# Patient Record
Sex: FEMALE | Race: BLACK OR AFRICAN AMERICAN | NOT HISPANIC OR LATINO | Employment: FULL TIME | URBAN - METROPOLITAN AREA
[De-identification: names, ages, dates, MRNs, and addresses within clinical notes are randomized per-mention and may not be internally consistent; named-entity substitution may affect disease eponyms.]

---

## 2017-01-11 ENCOUNTER — LAB VISIT (OUTPATIENT)
Dept: LAB | Facility: HOSPITAL | Age: 62
End: 2017-01-11
Attending: OBSTETRICS & GYNECOLOGY
Payer: COMMERCIAL

## 2017-01-11 ENCOUNTER — OFFICE VISIT (OUTPATIENT)
Dept: GYNECOLOGIC ONCOLOGY | Facility: CLINIC | Age: 62
End: 2017-01-11
Payer: COMMERCIAL

## 2017-01-11 VITALS
HEIGHT: 68 IN | WEIGHT: 235.44 LBS | SYSTOLIC BLOOD PRESSURE: 118 MMHG | DIASTOLIC BLOOD PRESSURE: 54 MMHG | BODY MASS INDEX: 35.68 KG/M2 | HEART RATE: 80 BPM

## 2017-01-11 DIAGNOSIS — C80.0 ADENOCARCINOMA CARCINOMATOSIS: Primary | ICD-10-CM

## 2017-01-11 DIAGNOSIS — Z51.11 ENCOUNTER FOR ANTINEOPLASTIC CHEMOTHERAPY: ICD-10-CM

## 2017-01-11 DIAGNOSIS — C80.0 ADENOCARCINOMA CARCINOMATOSIS: ICD-10-CM

## 2017-01-11 LAB
ANION GAP SERPL CALC-SCNC: 9 MMOL/L
BASOPHILS # BLD AUTO: 0.01 K/UL
BASOPHILS NFR BLD: 0.2 %
BUN SERPL-MCNC: 18 MG/DL
CALCIUM SERPL-MCNC: 9.4 MG/DL
CHLORIDE SERPL-SCNC: 104 MMOL/L
CO2 SERPL-SCNC: 29 MMOL/L
CREAT SERPL-MCNC: 0.8 MG/DL
DIFFERENTIAL METHOD: ABNORMAL
EOSINOPHIL # BLD AUTO: 0.1 K/UL
EOSINOPHIL NFR BLD: 1.2 %
ERYTHROCYTE [DISTWIDTH] IN BLOOD BY AUTOMATED COUNT: 17.8 %
EST. GFR  (AFRICAN AMERICAN): >60 ML/MIN/1.73 M^2
EST. GFR  (NON AFRICAN AMERICAN): >60 ML/MIN/1.73 M^2
GLUCOSE SERPL-MCNC: 94 MG/DL
HCT VFR BLD AUTO: 30.6 %
HGB BLD-MCNC: 10.2 G/DL
LYMPHOCYTES # BLD AUTO: 1.3 K/UL
LYMPHOCYTES NFR BLD: 30.4 %
MCH RBC QN AUTO: 26.4 PG
MCHC RBC AUTO-ENTMCNC: 33.3 %
MCV RBC AUTO: 79 FL
MONOCYTES # BLD AUTO: 0.3 K/UL
MONOCYTES NFR BLD: 7.8 %
NEUTROPHILS # BLD AUTO: 2.5 K/UL
NEUTROPHILS NFR BLD: 60.2 %
PLATELET # BLD AUTO: 208 K/UL
PMV BLD AUTO: 8.3 FL
POTASSIUM SERPL-SCNC: 4 MMOL/L
RBC # BLD AUTO: 3.87 M/UL
SODIUM SERPL-SCNC: 142 MMOL/L
WBC # BLD AUTO: 4.11 K/UL

## 2017-01-11 PROCEDURE — 80048 BASIC METABOLIC PNL TOTAL CA: CPT

## 2017-01-11 PROCEDURE — 85025 COMPLETE CBC W/AUTO DIFF WBC: CPT

## 2017-01-11 PROCEDURE — 1159F MED LIST DOCD IN RCRD: CPT | Mod: S$GLB,,, | Performed by: OBSTETRICS & GYNECOLOGY

## 2017-01-11 PROCEDURE — 99999 PR PBB SHADOW E&M-EST. PATIENT-LVL III: CPT | Mod: PBBFAC,,, | Performed by: OBSTETRICS & GYNECOLOGY

## 2017-01-11 PROCEDURE — 99214 OFFICE O/P EST MOD 30 MIN: CPT | Mod: S$GLB,,, | Performed by: OBSTETRICS & GYNECOLOGY

## 2017-01-11 PROCEDURE — 36415 COLL VENOUS BLD VENIPUNCTURE: CPT

## 2017-01-11 RX ORDER — SODIUM CHLORIDE 0.9 % (FLUSH) 0.9 %
10 SYRINGE (ML) INJECTION
Status: CANCELLED | OUTPATIENT
Start: 2017-01-13

## 2017-01-11 RX ORDER — FAMOTIDINE 10 MG/ML
20 INJECTION INTRAVENOUS
Status: CANCELLED | OUTPATIENT
Start: 2017-01-13

## 2017-01-11 RX ORDER — HEPARIN 100 UNIT/ML
500 SYRINGE INTRAVENOUS
Status: CANCELLED | OUTPATIENT
Start: 2017-01-13

## 2017-01-11 NOTE — PROGRESS NOTES
"Subjective:       Patient ID: Beulah Guillaume is a 61 y.o. female.    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 She complains of increasing abdominal distension after eating.     Chemotherapy labs have been reviewed and are appropriate for treatment.     Treatment history:  Taken to OR by Dr. Cole on 11/17/16 for lap cecille and found to have carcinomatosis. CT scan of abd and pelvis was consistent with carcinomatosis.  was 259 and CA 19-9 was 294. She also has been having PMB.     Review of Systems   Constitutional: Positive for fatigue. Negative for chills and fever.   Respiratory: Negative for cough, shortness of breath and wheezing.    Cardiovascular: Negative for chest pain, palpitations and leg swelling.   Gastrointestinal: Positive for abdominal distention. Negative for abdominal pain, constipation, diarrhea, nausea and vomiting.   Genitourinary: Negative for difficulty urinating, dysuria, frequency, genital sores, hematuria, urgency, vaginal bleeding, vaginal discharge and vaginal pain.   Neurological: Negative for weakness and numbness.   Hematological: Negative for adenopathy. Does not bruise/bleed easily.   Psychiatric/Behavioral: The patient is not nervous/anxious.        Objective:       Visit Vitals    BP (!) 118/54    Pulse 80    Ht 5' 8" (1.727 m)    Wt 106.8 kg (235 lb 7.2 oz)    LMP 06/20/2014    BMI 35.8 kg/m2       Physical Exam   Constitutional: She is oriented to person, place, and time. She appears well-developed and well-nourished.   HENT:   Head: Normocephalic and atraumatic.   Eyes: No scleral icterus.   Neck: Neck supple. No tracheal deviation present. No thyroid mass and no thyromegaly present.   Cardiovascular: Normal rate and regular rhythm.    Pulmonary/Chest: Effort normal and breath sounds normal. She has no wheezes.   Abdominal: Soft. She exhibits distension. She exhibits no mass. There is " no hepatosplenomegaly. There is no tenderness. There is no rebound and no guarding.   Genitourinary:   Genitourinary Comments: Bimanual exam:  Vulva: no lesions. Normal appearance  Urethra: Normal size and location. No lesions  Bladder: No masses or tenderness.  Vagina: normal mucosa. No lesion  Cervix: normal   Uterus: unable to palpate due to body habitus.   Adnexa: no masses.  Rectovaginal: fullness in posterior cul de sac.      Musculoskeletal: She exhibits no edema or tenderness.   Lymphadenopathy:     She has no cervical adenopathy.     She has no axillary adenopathy.        Right: No inguinal and no supraclavicular adenopathy present.        Left: No inguinal and no supraclavicular adenopathy present.   Neurological: She is alert and oriented to person, place, and time.   Skin: Skin is warm and dry.   Psychiatric: She has a normal mood and affect. Her behavior is normal. Judgment and thought content normal.       Assessment:       1. Adenocarcinoma carcinomatosis    2. Encounter for antineoplastic chemotherapy        Plan:   Adenocarcinoma carcinomatosis    Proceed with cycle 3 of neoadjuvant chemotherapy for primary peritoneal carcinoma.   RTC in 3 weeks with scan to evaluate for interval debulking.     -     Basic metabolic panel; Future; Expected date: 1/11/17  -     ; Future; Expected date: 1/11/17  -     CBC auto differential; Future; Expected date: 1/11/17  -     CT Abdomen Pelvis With Contrast; Future; Expected date: 1/11/17  -     CT Chest With Contrast; Future; Expected date: 1/11/17    Encounter for antineoplastic chemotherapy

## 2017-01-11 NOTE — MR AVS SNAPSHOT
[facility] - GYN Oncology  [address]  [place] [address]  Phone: [phone]                  Beulah Guillaume   2017 9:30 AM   Office Visit    Description:  Female : 1955   Provider:  Rey Hernandez MD   Department:  [facility] - GYN Oncology           Reason for Visit     Adenocarcinoma carcinomatosis           Diagnoses this Visit        Comments    Adenocarcinoma carcinomatosis    -  Primary     Encounter for antineoplastic chemotherapy                To Do List           Future Appointments        Provider Department Dept Phone    2017 9:00 AM [facility], CHEMO [facility] [phone]    2017 8:30 AM LAB, HEMONC CANCER BLDG [facility] [phone]    2017 9:45 AM Rey Hernandez MD [facility] Oncology [phone]    2/3/2017 9:30 AM [facility], CHEMO [facility] [phone]      Goals (5 Years of Data)     None      Follow-Up and Disposition     Return in about 3 weeks (around 2017).      [facility] On Call     [facility] On Call Nurse Care Line -  Assistance  Registered nurses in the [facility] On Call Center provide clinical advisement, health education, appointment booking, and other advisory services.  Call for this free service at [phone].             Medications           Message regarding Medications     Verify the changes and/or additions to your medication regime listed below are the same as discussed with your clinician today.  If any of these changes or additions are incorrect, please notify your healthcare provider.        STOP taking these medications     tramadol (ULTRAM) 50 mg tablet Take 50 mg by mouth every 6 (six) hours as needed for Pain.           Verify that the below list of medications is an accurate representation of the medications you are currently taking.  If none reported, the list may be blank. If incorrect, please contact your healthcare provider. Carry this list with you in case  "of emergency.           Current Medications     hydrochlorothiazide (MICROZIDE) 12.5 mg capsule     metformin (GLUCOPHAGE) 500 MG tablet 2 (two) times daily with meals.     olmesartan (BENICAR) 5 MG Tab     ondansetron (ZOFRAN-ODT) 8 MG TbDL Take 1 tablet (8 mg total) by mouth every 12 (twelve) hours as needed (nausea and vomiting.).           Clinical Reference Information           Vital Signs - Last Recorded  Most recent update: 1/11/2017  9:00 AM by Vivian Mckeon MA    BP Pulse Ht Wt LMP BMI    (!) 118/54 80 5' 8" (1.727 m) 106.8 kg (235 lb 7.2 oz) 06/20/2014 35.8 kg/m2      Blood Pressure          Most Recent Value    BP  (!)  118/54      Allergies as of 1/11/2017     No Known Allergies      Immunizations Administered on Date of Encounter - 1/11/2017     None      Orders Placed During Today's Visit     Future Labs/Procedures Expected by Expires    Basic metabolic panel  1/11/2017 1/11/2018      1/11/2017 1/11/2018    CBC auto differential  1/11/2017 1/11/2018    CT Abdomen Pelvis With Contrast  1/11/2017 1/11/2018    CT Chest With Contrast  1/11/2017 1/11/2018      [facility] Sign-Up     Activating your [facility] account is as easy as 1-2-3!     1) Visit [email or web address], select Sign Up Now, enter this activation code and your date of birth, then select Next.  [email or web address]  Expires: 2/25/2017 10:06 AM      2) Create a username and password to use when you visit [facility] in the future and select a security question in case you lose your password and select Next.    3) Enter your e-mail address and click Sign Up!    Additional Information  If you have questions, please e-mail [email or web address] or call [phone] to talk to our [facility] staff. Remember, [facility] is NOT to be used for urgent needs. For medical emergencies, dial 911.         "

## 2017-01-13 ENCOUNTER — INFUSION (OUTPATIENT)
Dept: INFUSION THERAPY | Facility: HOSPITAL | Age: 62
End: 2017-01-13
Attending: INTERNAL MEDICINE
Payer: COMMERCIAL

## 2017-01-13 VITALS
DIASTOLIC BLOOD PRESSURE: 64 MMHG | RESPIRATION RATE: 18 BRPM | TEMPERATURE: 99 F | SYSTOLIC BLOOD PRESSURE: 134 MMHG | HEART RATE: 68 BPM

## 2017-01-13 DIAGNOSIS — C80.0 ADENOCARCINOMA CARCINOMATOSIS: Primary | ICD-10-CM

## 2017-01-13 PROCEDURE — 96417 CHEMO IV INFUS EACH ADDL SEQ: CPT

## 2017-01-13 PROCEDURE — 25000003 PHARM REV CODE 250: Performed by: OBSTETRICS & GYNECOLOGY

## 2017-01-13 PROCEDURE — 96413 CHEMO IV INFUSION 1 HR: CPT

## 2017-01-13 PROCEDURE — 96367 TX/PROPH/DG ADDL SEQ IV INF: CPT

## 2017-01-13 PROCEDURE — 96361 HYDRATE IV INFUSION ADD-ON: CPT

## 2017-01-13 PROCEDURE — 96375 TX/PRO/DX INJ NEW DRUG ADDON: CPT

## 2017-01-13 PROCEDURE — 96415 CHEMO IV INFUSION ADDL HR: CPT

## 2017-01-13 PROCEDURE — 63600175 PHARM REV CODE 636 W HCPCS: Performed by: OBSTETRICS & GYNECOLOGY

## 2017-01-13 RX ORDER — FAMOTIDINE 10 MG/ML
20 INJECTION INTRAVENOUS
Status: COMPLETED | OUTPATIENT
Start: 2017-01-13 | End: 2017-01-13

## 2017-01-13 RX ADMIN — PACLITAXEL 390 MG: 6 INJECTION, SOLUTION, CONCENTRATE INTRAVENOUS at 09:01

## 2017-01-13 RX ADMIN — FAMOTIDINE 20 MG: 10 INJECTION INTRAVENOUS at 08:01

## 2017-01-13 RX ADMIN — CARBOPLATIN 870 MG: 10 INJECTION, SOLUTION INTRAVENOUS at 12:01

## 2017-01-13 RX ADMIN — DIPHENHYDRAMINE HYDROCHLORIDE 50 MG: 50 INJECTION, SOLUTION INTRAMUSCULAR; INTRAVENOUS at 09:01

## 2017-01-13 RX ADMIN — DEXAMETHASONE SODIUM PHOSPHATE 0.25 MG: 4 INJECTION, SOLUTION INTRAMUSCULAR; INTRAVENOUS at 08:01

## 2017-01-13 RX ADMIN — SODIUM CHLORIDE: 0.9 INJECTION, SOLUTION INTRAVENOUS at 12:01

## 2017-01-13 NOTE — MR AVS SNAPSHOT
Patient Information     Patient Name Sex     Beulah Guillaume Female 1955      Visit Information        Provider Department Dept Phone Center    2017 9:00 AM [facility], CHEMO [facility] Chemotherapy Infusion [phone] Duane Miller      Patient Instructions     None      Your Current Medications Are     hydrochlorothiazide (MICROZIDE) 12.5 mg capsule    metformin (GLUCOPHAGE) 500 MG tablet    olmesartan (BENICAR) 5 MG Tab    ondansetron (ZOFRAN-ODT) 8 MG TbDL      Facility-Administered Medications     carboplatin (PARAPLATIN) 870 mg in sodium chloride 0.9% 250 mL chemo infusion    diphenhydramine IVPB 50 mg    famotidine (PF) 20 mg/2 mL injection 20 mg    paclitaxel (TAXOL) 175 mg/m2 = 390 mg in sodium chloride 0.9% 500 mL chemo infusion    palonosetron 0.25mg/dexamethasone 20mg IVPB    sodium chloride 0.9% 500 mL infusion      Appointments for Next Year     2017 12:45 PM NON FASTING LAB (15 min.) [facility] LAB, SAME DAY    Arrive at check-in approximately 15 minutes before your scheduled appointment time. Bring all outside medical records and imaging, along with a list of your current medications and insurance card.    2nd Floor    2017  3:00 PM CT ABD PEL W CONTRAST (15 min.) [facility] CT2 64- LIMIT 600 LBS    You must  fast four (4) hours prior to your appointment. Arrive 2 hours early for your appointment to drink the exam prep.    2nd floor    2017  3:30 PM CT CHEST W CONTRAST (15 min.) [facility] CT1 64- LIMIT 450 LBS    Please fast for 4 hours prior to appointment. Arrive at check-in approximately 30 minutes before your scheduled appointment time. Bring all outside medical records and imaging, along with a list of your current medications and insurance card.    2nd floor    2017  8:30 AM NON FASTING LAB (10 min.) [facility] LAB, [facility] CANCER [facility]    Arrive at check-in approximately  15 minutes before your scheduled appointment time. Bring all outside medical records and imaging, along with a list of your current medications and insurance card.    [facility] 3rd Floor    2/1/2017  9:45 AM CHEMOTHERAPHY (15 min.) [facility] - GYN Oncology Rey Hernandez MD    Arrive at check-in approximately 15 minutes before your scheduled appointment time. Bring all outside medical records and imaging, along with a list of your current medications and insurance card.    [facility] 2nd Floor    2/3/2017  9:30 AM INFUSION 300 MIN (300 min.) [facility], CHEMO    Arrive at check-in approximately 15 minutes before your scheduled appointment time. Bring all outside medical records and imaging, along with a list of your current medications and insurance card.    [facility], 5th Floor         Default Flowsheet Data (last 24 hours)      Amb Complex Vitals Harinder        01/13/17 1017 01/13/17 0831             Measurements    /64   taxol at 15 min 125/60       Temp  98.6 °F (37 °C)       Pulse 68 71       Resp 18 18       Pain Assessment    Pain Score  Zero               Allergies     No Known Allergies       Medications You Received from 01/12/2017 1433 to 01/13/2017 1433        Date/Time Order Dose Route Action     01/13/2017 1252 carboplatin (PARAPLATIN) 870 mg in sodium chloride 0.9% 250 mL chemo infusion 870 mg Intravenous New Bag     01/13/2017 0913 diphenhydramine IVPB 50 mg 50 mg Intravenous New Bag     01/13/2017 0854 famotidine (PF) 20 mg/2 mL injection 20 mg 20 mg Intravenous Given     01/13/2017 0958 paclitaxel (TAXOL) 175 mg/m2 = 390 mg in sodium chloride 0.9% 500 mL chemo infusion 390 mg Intravenous New Bag     01/13/2017 0853 palonosetron 0.25mg/dexamethasone 20mg IVPB 0.25 mg Intravenous New Bag     01/13/2017 1255 sodium chloride 0.9% 500 mL infusion   Intravenous New Bag      Current Discharge Medication List     Cannot display discharge  medications since this is not an admission.

## 2017-01-13 NOTE — PLAN OF CARE
Problem: Patient Care Overview (Adult)  Goal: Plan of Care Review  1. PIV  2. Likes to watch tv during treatment   Outcome: Ongoing (interventions implemented as appropriate)  Pt completed today's chemotherapy infusion without incidence, tolerated well.  Discharge instructions provided, patient verbalized understanding.  Patient discharged ambulatory with steady gait. No apparent distress noted prior to discharge.

## 2017-01-21 ENCOUNTER — NURSE TRIAGE (OUTPATIENT)
Dept: ADMINISTRATIVE | Facility: CLINIC | Age: 62
End: 2017-01-21

## 2017-01-21 NOTE — TELEPHONE ENCOUNTER
Reason for Disposition   [1] Fever AND [2] no signs of serious infection or localizing symptoms (all other triage questions negative)    Protocols used:  CANCER - FEVER-A-AH    Patient reports she had chemo about 8 days ago.  Has been with sinus acongestins and head cold.  Wanted to know what tempo should she be concerned with.  advised she would need to go to Ed for temp above 100.4.  States she was only at 100.  States she took some zyrtec and a rajiv den and feels much better now.  Advised that Vicodin had tylenol and to be aware of any medication that may hide her fever.  Advised if temp goes above 100.4 she needs to go to ED.  States her immune system has not been low.  Wbc was normal last checked.  States her daughter is getting a new thermometer in case the one she had was not working well.  Advised to call for any questions or concerns.

## 2017-01-25 ENCOUNTER — HOSPITAL ENCOUNTER (OUTPATIENT)
Dept: RADIOLOGY | Facility: HOSPITAL | Age: 62
Discharge: HOME | End: 2017-01-25
Attending: OBSTETRICS & GYNECOLOGY
Payer: COMMERCIAL

## 2017-01-25 DIAGNOSIS — C80.0 ADENOCARCINOMA CARCINOMATOSIS: ICD-10-CM

## 2017-01-25 PROCEDURE — 25500020 PHARM REV CODE 255: Performed by: OBSTETRICS & GYNECOLOGY

## 2017-01-25 PROCEDURE — 71260 CT THORAX DX C+: CPT | Mod: 26,,, | Performed by: RADIOLOGY

## 2017-01-25 PROCEDURE — 74177 CT ABD & PELVIS W/CONTRAST: CPT | Mod: 26,,, | Performed by: RADIOLOGY

## 2017-01-25 PROCEDURE — 74177 CT ABD & PELVIS W/CONTRAST: CPT | Mod: TC

## 2017-01-25 PROCEDURE — 71260 CT THORAX DX C+: CPT | Mod: TC

## 2017-01-25 RX ADMIN — IOHEXOL 100 ML: 350 INJECTION, SOLUTION INTRAVENOUS at 04:01

## 2017-01-25 RX ADMIN — IOHEXOL 15 ML: 350 INJECTION, SOLUTION INTRAVENOUS at 02:01

## 2017-01-26 ENCOUNTER — TELEPHONE (OUTPATIENT)
Dept: GYNECOLOGIC ONCOLOGY | Facility: CLINIC | Age: 62
End: 2017-01-26

## 2017-01-26 DIAGNOSIS — R91.8 MASS OF MIDDLE LOBE OF RIGHT LUNG: ICD-10-CM

## 2017-01-26 DIAGNOSIS — C80.0 ADENOCARCINOMA CARCINOMATOSIS: Primary | ICD-10-CM

## 2017-01-26 NOTE — PROGRESS NOTES
Patient informed of CT scan of abdomen and pelvis that shows a decrease in omental and peritoneal disease. However, her ascites has worsen and she has a new lesion in there right middle lobe of the lung. Differential diagnosis of metastatic disease vs infection.  had declined from 259 to 75. I told her this results are conflicting between the  and the CT scan.     I told her I plan to do the followin. CT scan of the chest  2. Pulmonary consult   3. Paracentesis for the ascites.     I told her these need to be addressed before I can make a decision about operating on her.

## 2017-01-26 NOTE — TELEPHONE ENCOUNTER
----- Message from Malena Whiting sent at 1/26/2017 11:52 AM CST -----  Contact: XIANG ROE [[id number]]  _  1st Request  _  2nd Request  _  3rd Request        Who: XIANG ROE [[id number]]    Why: patient states she would like a call back from the staff.     What Number to Call Back: [phone]    When to Expect a call back: (Before the end of the day)   -- if call after 3:00 call back will be tomorrow.

## 2017-01-26 NOTE — TELEPHONE ENCOUNTER
Spoke with pt. Pt c/o stinging and tingling feeling on outer part of both legs between the knees and thighs, that started over the weekend. Pt states she is taking lyrica that she had for diabetic neuropathy. Pt was advised Dr. Hernandez is in surgery and a message will be forward to physician. Pt voiced understanding

## 2017-01-27 ENCOUNTER — TELEPHONE (OUTPATIENT)
Dept: GYNECOLOGIC ONCOLOGY | Facility: CLINIC | Age: 62
End: 2017-01-27

## 2017-01-27 DIAGNOSIS — E11.41 TYPE 2 DIABETES MELLITUS WITH DIABETIC MONONEUROPATHY, WITHOUT LONG-TERM CURRENT USE OF INSULIN: ICD-10-CM

## 2017-01-27 RX ORDER — PREGABALIN 100 MG/1
100 CAPSULE ORAL 2 TIMES DAILY
Qty: 60 CAPSULE | Refills: 2 | Status: SHIPPED | OUTPATIENT
Start: 2017-01-27 | End: 2018-01-27

## 2017-01-27 NOTE — TELEPHONE ENCOUNTER
----- Message from Rey Hernandez MD sent at 2017  5:30 PM CST -----  Patient informed of CT scan of abdomen and pelvis that shows a decrease in omental and peritoneal disease. However, her ascites has worsen and she has a new lesion in there right middle lobe of the lung. Differential diagnosis of metastatic disease vs infection.  had declined from 259 to 75. I told her this results are conflicting between the  and the CT scan.     I told her I plan to do the followin. CT scan of the chest  2. Pulmonary consult   3. Paracentesis for the ascites.     I told her these need to be addressed before I can make a decision about operating on her.     Orders have been placed.

## 2017-01-30 ENCOUNTER — OFFICE VISIT (OUTPATIENT)
Dept: PULMONOLOGY | Facility: CLINIC | Age: 62
End: 2017-01-30
Payer: COMMERCIAL

## 2017-01-30 VITALS
HEIGHT: 68 IN | DIASTOLIC BLOOD PRESSURE: 60 MMHG | WEIGHT: 238.13 LBS | BODY MASS INDEX: 36.09 KG/M2 | HEART RATE: 91 BPM | SYSTOLIC BLOOD PRESSURE: 106 MMHG | OXYGEN SATURATION: 98 %

## 2017-01-30 DIAGNOSIS — R18.8 OTHER ASCITES: ICD-10-CM

## 2017-01-30 DIAGNOSIS — C80.0 ADENOCARCINOMA CARCINOMATOSIS: Primary | ICD-10-CM

## 2017-01-30 DIAGNOSIS — J18.9 PNEUMONIA DUE TO INFECTIOUS ORGANISM, UNSPECIFIED LATERALITY, UNSPECIFIED PART OF LUNG: Primary | ICD-10-CM

## 2017-01-30 DIAGNOSIS — R91.8 LUNG MASS: ICD-10-CM

## 2017-01-30 PROCEDURE — 1159F MED LIST DOCD IN RCRD: CPT | Mod: S$GLB,,, | Performed by: INTERNAL MEDICINE

## 2017-01-30 PROCEDURE — 99999 PR PBB SHADOW E&M-EST. PATIENT-LVL III: CPT | Mod: PBBFAC,,, | Performed by: INTERNAL MEDICINE

## 2017-01-30 PROCEDURE — 99203 OFFICE O/P NEW LOW 30 MIN: CPT | Mod: 25,S$GLB,, | Performed by: INTERNAL MEDICINE

## 2017-01-30 RX ORDER — LEVOFLOXACIN 750 MG/1
750 TABLET ORAL DAILY
Qty: 7 TABLET | Refills: 0 | Status: SHIPPED | OUTPATIENT
Start: 2017-01-30 | End: 2017-02-06

## 2017-01-30 NOTE — LETTER
January 30, 2017      Rey Hernandez MD  [address] [facility] [address]           [facility] - Pulmonary Services  [address]  [place] [address]  Phone: [phone]          Patient: Beulah Guillaume   MR Number: [id number]   YOB: 1955   Date of Visit: 1/30/2017       Dear Dr. Rey Hernandez:    Thank you for referring Beulah Guillaume to me for evaluation. Attached you will find relevant portions of my assessment and plan of care.    If you have questions, please do not hesitate to call me. I look forward to following Beulah Guillaume along with you.    Sincerely,    Thomas Johnson MD    Enclosure  CC:  No Recipients    If you would like to receive this communication electronically, please contact [email or web address] or [phone] to request more information on [facility] access.    For providers and/or their staff who would like to refer a patient to [facility], please contact us through our one-stop-shop provider referral line, [facility], at [phone].    If you feel you have received this communication in error or would no longer like to receive these types of communications, please e-mail [email or web address]

## 2017-01-30 NOTE — PROGRESS NOTES
Pulmonary Clinic: Initial Consultation      HPI     60 y/o female with a PMH significant for a 30 pack year history of tobacco and primary peritoneal carcinoma/adenocarcinoma carinomasositis (diagnosed 11/16, actively receiving chemotherapy/debulking) wh presents as a referral by her Gyn/Onc (Dr. Hernandez) after a interval surveilance CT showed a new lesion in the RML of the lung (1/25/17, concern for metastastic disease vs. infection)    In clinic today without any fevers, chills, cough, night sweats.   She endorses previous fevers, productive cough prior to CT but now cleared (took tylenol with relief, not on antibitiocs).  She has appreicated at 20 pound weight loss over the last 6 months.         Review of Systems      Negative, except per HPI    Objective:       Vitals:    01/30/17 1504   BP: 106/60   Pulse: 91       Physical Exam     Gen: AAOx3, NAD  Head: NC/AT  Eyes: PERRLA, EOMI  Ears: no discharge  Nose: no nasal polyps  Throat: no tonsillar exudate  Neck: no masses  Lungs: CTAB, no wheezing, rhonci or rales  Cardiac: S1/S2=normal, no murmurs, rubs or gallops  Abdomen: soft, nontender, distended, BS+  Extremities: no edema  Skin: no rashes          Assessment/Plan       60 y/o female with a PMH significant for a 30 pack year history of tobacco and primary peritoneal carcinoma/adenocarcinoma carinomasositis (diagnosed 11/16, actively receiving chemotherapy/debulking) wh presents as a referral by her Gyn/Onc (Dr. Hernandez) after a interval surveilance CT showed a new lesion in the RML of the lung (1/25/17, concern for metastastic disease vs. infection)    In clinic today without any fevers, chills, cough, night sweats.   She endorses previous fevers, productive cough prior to CT but now cleared (took tylenol with relief, not on antibitiocs).  She has appreicated at 20 pound weight loss over the last 6 months.     RML Lung Lesion on CT (1/25/17)    -Most likely infectious process (as patient with  fevers/chills/produtive cough prior to CT one week ago, not symptomatic in clinic)   -However no imaging to compare to and do not want to delay anticipated surgery/debulking  -Will schedule a bronchoscopy for Friday: 2/3/17 and prescribe a 7 day course of Levofloxacin in the interim    Chris Montejo MD, MSc  Pulmonary/Critical Care Fellow

## 2017-01-31 ENCOUNTER — TELEPHONE (OUTPATIENT)
Dept: GYNECOLOGIC ONCOLOGY | Facility: CLINIC | Age: 62
End: 2017-01-31

## 2017-01-31 ENCOUNTER — HOSPITAL ENCOUNTER (OUTPATIENT)
Dept: INTERVENTIONAL RADIOLOGY/VASCULAR | Facility: HOSPITAL | Age: 62
Discharge: HOME | End: 2017-01-31
Attending: OBSTETRICS & GYNECOLOGY
Payer: COMMERCIAL

## 2017-01-31 VITALS
SYSTOLIC BLOOD PRESSURE: 132 MMHG | RESPIRATION RATE: 18 BRPM | DIASTOLIC BLOOD PRESSURE: 56 MMHG | OXYGEN SATURATION: 98 % | HEART RATE: 78 BPM

## 2017-01-31 DIAGNOSIS — C80.0 ADENOCARCINOMA CARCINOMATOSIS: ICD-10-CM

## 2017-01-31 PROCEDURE — 63600175 PHARM REV CODE 636 W HCPCS: Performed by: OBSTETRICS & GYNECOLOGY

## 2017-01-31 PROCEDURE — 49083 ABD PARACENTESIS W/IMAGING: CPT | Mod: ,,, | Performed by: FAMILY MEDICINE

## 2017-01-31 PROCEDURE — 88112 CYTOPATH CELL ENHANCE TECH: CPT | Mod: 26,,, | Performed by: PATHOLOGY

## 2017-01-31 PROCEDURE — P9047 ALBUMIN (HUMAN), 25%, 50ML: HCPCS | Performed by: OBSTETRICS & GYNECOLOGY

## 2017-01-31 PROCEDURE — C1729 CATH, DRAINAGE: HCPCS

## 2017-01-31 PROCEDURE — 88305 TISSUE EXAM BY PATHOLOGIST: CPT | Mod: 26,,, | Performed by: PATHOLOGY

## 2017-01-31 PROCEDURE — 88305 TISSUE EXAM BY PATHOLOGIST: CPT | Performed by: PATHOLOGY

## 2017-01-31 RX ORDER — ALBUMIN HUMAN 250 G/1000ML
37.5 SOLUTION INTRAVENOUS ONCE
Status: COMPLETED | OUTPATIENT
Start: 2017-01-31 | End: 2017-01-31

## 2017-01-31 RX ADMIN — ALBUMIN (HUMAN) 37.5 G: 25 SOLUTION INTRAVENOUS at 11:01

## 2017-01-31 NOTE — DISCHARGE INSTRUCTIONS
[facility] [phone] (MON-FRI 8 AM- 5PM). Radiology Resident on call [phone].

## 2017-01-31 NOTE — TELEPHONE ENCOUNTER
Spoke with pt sister. Pt was brought to the back to begin procedure for paracentesis. Sister was advised to have pt contact office regarding appts. Dr. Hernandez spoke with Dr. Johnson

## 2017-01-31 NOTE — TELEPHONE ENCOUNTER
----- Message from Juana Moya MA sent at 1/31/2017  9:51 AM CST -----  Pt has questions about her appts, please call

## 2017-01-31 NOTE — PROGRESS NOTES
Paracentesis completed, pt tolerated well. No apparent distress noted. 5 Liters removed from left abdomen, mepore applied CDI. Labs collected and sent. Albumin 150 ml given per protocol.  Discharge instructions reviewed and acknowledged. Pt refused wheelchair, discharged via ambulation and private vehicle.

## 2017-01-31 NOTE — IP AVS SNAPSHOT
[facility]  [address]  [facility] [address]  Phone: [phone]           Patient Discharge Instructions     Our goal is to set you up for success. This packet includes information on your condition, medications, and your home care. It will help you to care for yourself so you don't get sicker and need to go back to the hospital.     Please ask your nurse if you have any questions.        There are many details to remember when preparing to leave the hospital. Here is what you will need to do:    1. Take your medicine. If you are prescribed medications, review your Medication List in the following pages. You may have new medications to  at the pharmacy and others that you'll need to stop taking. Review the instructions for how and when to take your medications. Talk with your doctor or nurses if you are unsure of what to do.     2. Go to your follow-up appointments. Specific follow-up information is listed in the following pages. Your may be contacted by a transition nurse or clinical provider about future appointments. Be sure we have all of the phone numbers to reach you, if needed. Please contact your provider's office if you are unable to make an appointment.     3. Watch for warning signs. Your doctor or nurse will give you detailed warning signs to watch for and when to call for assistance. These instructions may also include educational information about your condition. If you experience any of warning signs to your health, call your doctor.               [facility]  Unless otherwise directed by your provider, please contact [facility], our nurse care line that is available for 24/7 assistance.     [phone] (toll-free)    Registered nurses in the [facility] provide clinical advisement, health education, appointment booking, and other advisory services.                    ** Verify the list of medication(s) below is accurate and up  to date. Carry this with you in case of emergency. If your medications have changed, please notify your healthcare provider.             Medication List      TAKE these medications        Additional Info                      BENICAR 5 MG Tab   Refills:  0   Generic drug:  olmesartan      Begin Date    AM    Noon    PM    Bedtime       GLUCOPHAGE 500 MG tablet   Refills:  0   Generic drug:  metformin    Instructions:  2 (two) times daily with meals.     Begin Date    AM    Noon    PM    Bedtime       hydrochlorothiazide 12.5 mg capsule   Commonly known as:  MICROZIDE   Refills:  0      Begin Date    AM    Noon    PM    Bedtime       levoFLOXacin 750 MG tablet   Commonly known as:  LEVAQUIN   Quantity:  7 tablet   Refills:  0   Dose:  750 mg    Instructions:  Take 1 tablet (750 mg total) by mouth once daily.     Begin Date    AM    Noon    PM    Bedtime       ondansetron 8 MG Tbdl   Commonly known as:  ZOFRAN-ODT   Quantity:  12 tablet   Refills:  1   Dose:  8 mg    Instructions:  Take 1 tablet (8 mg total) by mouth every 12 (twelve) hours as needed (nausea and vomiting.).     Begin Date    AM    Noon    PM    Bedtime       pregabalin 100 MG capsule   Commonly known as:  LYRICA   Quantity:  60 capsule   Refills:  2   Dose:  100 mg    Instructions:  Take 1 capsule (100 mg total) by mouth 2 (two) times daily.     Begin Date    AM    Noon    PM    Bedtime                  Please bring to all follow up appointments:    1. A copy of your discharge instructions.  2. All medicines you are currently taking in their original bottles.  3. Identification and insurance card.    Please arrive 15 minutes ahead of scheduled appointment time.    Please call 24 hours in advance if you must reschedule your appointment and/or time.        Your Scheduled Appointments     Feb 01, 2017  8:30 AM CST   Non-Fasting Lab with LAB, HEMONC CANCER BLDG [facility] ([facility])    [address]  [facility]  [id number] [phone]            Feb 01, 2017  9:45 AM CST   Chemotherapy with Rey Hernanedz MD   [facility] - GYN Oncology ([facility] )    [address]  [place] [id number] [phone]            Feb 03, 2017  9:30 AM CST   Infusion 300 Min with [facility], CHEMO   [facility] ([facility])    [address] [facility] [id number] [phone]              Your Future Surgeries/Procedures     Feb 03, 2017   Surgery with Dosc Diagnostic Provider   [facility] ([facility])    [address] [facility] [id number] [phone]                  Discharge Instructions       [facility] [phone] (MON-FRI 8 AM- 5PM). Radiology Resident on call [phone].      Discharge References/Attachments     PARACENTESIS, DISCHARGE INSTRUCTIONS FOR (ENGLISH)        Admission Information     Date & Time Provider Department CSN    1/31/2017 10:00 AM Rey Hernandez MD [facility] [id number]      Care Providers     Provider Role Specialty Primary office phone    Rey Hernandez MD Attending Provider Gynecologic Oncology [phone]      Your Vitals Were     BP Pulse Resp Last Period SpO2       125/67 (BP Location: Right arm, Patient Position: Sitting, BP Method: Automatic) 82 18 06/20/2014 97%       Recent Lab Values     No lab values to display.      Allergies as of 1/31/2017        Reactions    No Known Allergies       Advance Directives     An advance directive is a document which, in the event you are no longer able to make decisions for yourself, tells your healthcare team what kind of treatment you do or do not want to receive, or who you would like to make those decisions for you.  If you do not currently have an advance directive, [facility] encourages you to create one.  For more information call:  [phone]), [phone] ([phone]),  or log on to [email or web address].        Smoking  Cessation     If you would like to quit smoking:   You may be eligible for free services if you are a [place] resident and started smoking cigarettes before September 1, 1988.  Call the Smoking Cessation Trust (SCT) toll free at (968) 575-6156 or (924) 775-2689.   Call 0-856-QUIT-NOW if you do not meet the above criteria.            Language Assistance Services     ATTENTION: Language assistance services are available, free of charge. Please call [phone].      ATENCIÓN: Si habla deepa, tiene a michelle disposición servicios gratuitos de asistencia lingüística. Llame al [phone].     CHÚ Ý: N?u b?n nói Ti?ng Vi?t, có các d?ch v? h? tr? ngôn ng? mi?n phí dành cho b?n. G?i s? [phone].        Diabetes Discharge Instructions                                   [facility] Sign-Up     Activating your [facility] account is as easy as 1-2-3!     1) Visit [email or web address], select Sign Up Now, enter this activation code and your date of birth, then select Next.  [email or web address]  Expires: 2/25/2017 10:06 AM      2) Create a username and password to use when you visit [facility] in the future and select a security question in case you lose your password and select Next.    3) Enter your e-mail address and click Sign Up!    Additional Information  If you have questions, please e-mail [email or web address] or call [phone] to talk to our [facility] staff. Remember, [facility] is NOT to be used for urgent needs. For medical emergencies, dial 911.          [facility] complies with applicable Federal civil rights laws and does not discriminate on the basis of race, color, national origin, age, disability, or sex.

## 2017-01-31 NOTE — PROCEDURES
Radiology Post-Procedure Note    Pre Op Diagnosis: Ascites  Post Op Diagnosis: Same    Procedure: Ultrasound Guided Paracentesis    Procedure performed by: Aracely RODRIGUEZ, [place]     Written Informed Consent Obtained: Yes  Specimen Removed: YES bloody fluid  Estimated Blood Loss: Minimal    Findings:   Successful paracentesis.  Albumin administered PRN per protocol.    Patient tolerated procedure well.    [place] Jessica, APRN, FNP  Interventional Radiology  [phone] spectralink

## 2017-01-31 NOTE — TELEPHONE ENCOUNTER
Spoke with pt. She states she is scheduled for chemo on Friday 2/3/17, Dr. Johnson has scheduled pt for bronchoscopy on the same day. Pt states Dr. Johnson tried to reach Dr. Hernandez yesterday to discuss canceling CT Scan that was scheduled for today. Pt thinks spot that was previously found her CT is due to having a cold and she is on antibiotics. Pt was advised Dr. Hernandez had a surgery case this morning and will be in clinic soon. Will consult with physician. She voiced understanding

## 2017-01-31 NOTE — H&P
Radiology History & Physical      SUBJECTIVE:     Chief Complaint: ascites    History of Present Illness:  Beulah Guillaume is a 61 y.o. female who presents for ultrasound guided paracentesis  Past Medical History   Diagnosis Date    Abnormal liver CT     Abnormal Pap smear      1985    Arthritis 2016    Chemotherapy induced nausea and vomiting 2016    Diabetes mellitus     Hypertension     Mass of middle lobe of right lung 2017    Post-menopausal bleeding 2016    Type 2 diabetes mellitus with diabetic mononeuropathy, without long-term current use of insulin 2017    Type 2 diabetes mellitus without complication 2016     Past Surgical History   Procedure Laterality Date    Colposcopy       section       x 2    Dilation and curettage of uterus       for Polyps       Home Meds:   Prior to Admission medications    Medication Sig Start Date End Date Taking? Authorizing Provider   hydrochlorothiazide (MICROZIDE) 12.5 mg capsule  13   Historical Provider, MD   levoFLOXacin (LEVAQUIN) 750 MG tablet Take 1 tablet (750 mg total) by mouth once daily. 17  Chris Montejo MD   metformin (GLUCOPHAGE) 500 MG tablet 2 (two) times daily with meals.  11   Historical Provider, MD   olmesartan (BENICAR) 5 MG Tab  11   Historical Provider, MD   ondansetron (ZOFRAN-ODT) 8 MG TbDL Take 1 tablet (8 mg total) by mouth every 12 (twelve) hours as needed (nausea and vomiting.). 16   Rey Hernandez MD   pregabalin (LYRICA) 100 MG capsule Take 1 capsule (100 mg total) by mouth 2 (two) times daily. 17  Rey Hernandez MD     Anticoagulants/Antiplatelets: no anticoagulation    Allergies:   Review of patient's allergies indicates:   Allergen Reactions    No known allergies      Sedation History:  no adverse reactions    Review of Systems:   Hematological: no known coagulopathies  Respiratory: no shortness of breath  Cardiovascular: no  chest pain  Gastrointestinal: no abdominal pain  Genito-Urinary: no dysuria  Musculoskeletal: negative  Neurological: no TIA or stroke symptoms         OBJECTIVE:     Vital Signs (Most Recent)  Pulse: 82 (01/31/17 1103)  Resp: 18 (01/31/17 1103)  BP: 125/67 (01/31/17 1103)  SpO2: 97 % (01/31/17 1103)    Physical Exam:  ASA: 3  Mallampati: n/a    General: no acute distress  Mental Status: alert and oriented to person, place and time  HEENT: normocephalic, atraumatic  Chest: unlabored breathing  Heart: regular heart rate  Abdomen: distended  Extremity: moves all extremities    Laboratory  Lab Results   Component Value Date    INR 1.0 01/31/2017       Lab Results   Component Value Date    WBC 4.56 01/25/2017    HGB 9.6 (L) 01/25/2017    HCT 28.3 (L) 01/25/2017    MCV 77 (L) 01/25/2017     01/25/2017      Lab Results   Component Value Date    GLU 94 01/25/2017     01/25/2017    K 3.5 01/25/2017     01/25/2017    CO2 29 01/25/2017    BUN 33 (H) 01/25/2017    CREATININE 1.2 01/25/2017    CALCIUM 9.5 01/25/2017    ALT 28 11/18/2016    AST 19 11/18/2016    ALBUMIN 3.0 (L) 11/18/2016    BILITOT 0.2 11/18/2016       ASSESSMENT/PLAN:     Sedation Plan: local  Patient will undergo ultrasound guided paracentesis.    [place] Jessica, APRN, FNP  Interventional Radiology  [phone] spectralink

## 2017-02-01 ENCOUNTER — TELEPHONE (OUTPATIENT)
Dept: GYNECOLOGIC ONCOLOGY | Facility: CLINIC | Age: 62
End: 2017-02-01

## 2017-02-01 NOTE — TELEPHONE ENCOUNTER
----- Message from Christine Meadows sent at 1/31/2017  3:11 PM CST -----  Beulah Guillaume said she needs to speak with medical assistant/can be reached at [phone]              [facility]#  [id number]

## 2017-02-01 NOTE — TELEPHONE ENCOUNTER
----- Message from Christine Meadows sent at 2/1/2017  9:01 AM CST -----  Beulah Guillaume is returning call to Vivian/andrea can be reached at [phone]

## 2017-02-01 NOTE — TELEPHONE ENCOUNTER
Spoke with pt. Per Dr. Hernandez, pt is aware chemo has been canceled for Friday and will need to have labs drawn. Pt has been rescheduled for labs on Thursday 2/2/17. She voiced understanding

## 2017-02-02 ENCOUNTER — LAB VISIT (OUTPATIENT)
Dept: LAB | Facility: HOSPITAL | Age: 62
End: 2017-02-02
Attending: OBSTETRICS & GYNECOLOGY
Payer: COMMERCIAL

## 2017-02-02 ENCOUNTER — TELEPHONE (OUTPATIENT)
Dept: GYNECOLOGIC ONCOLOGY | Facility: CLINIC | Age: 62
End: 2017-02-02

## 2017-02-02 DIAGNOSIS — C80.0 ADENOCARCINOMA CARCINOMATOSIS: ICD-10-CM

## 2017-02-02 LAB
ANION GAP SERPL CALC-SCNC: 9 MMOL/L
BASOPHILS # BLD AUTO: 0.02 K/UL
BASOPHILS NFR BLD: 0.3 %
BUN SERPL-MCNC: 27 MG/DL
CALCIUM SERPL-MCNC: 9.6 MG/DL
CHLORIDE SERPL-SCNC: 104 MMOL/L
CO2 SERPL-SCNC: 28 MMOL/L
CREAT SERPL-MCNC: 1 MG/DL
DIFFERENTIAL METHOD: ABNORMAL
EOSINOPHIL # BLD AUTO: 0.1 K/UL
EOSINOPHIL NFR BLD: 1.9 %
ERYTHROCYTE [DISTWIDTH] IN BLOOD BY AUTOMATED COUNT: 18.9 %
EST. GFR  (AFRICAN AMERICAN): >60 ML/MIN/1.73 M^2
EST. GFR  (NON AFRICAN AMERICAN): >60 ML/MIN/1.73 M^2
GLUCOSE SERPL-MCNC: 101 MG/DL
HCT VFR BLD AUTO: 27.6 %
HGB BLD-MCNC: 8.9 G/DL
LYMPHOCYTES # BLD AUTO: 1.5 K/UL
LYMPHOCYTES NFR BLD: 23.5 %
MCH RBC QN AUTO: 26.4 PG
MCHC RBC AUTO-ENTMCNC: 32.2 %
MCV RBC AUTO: 82 FL
MONOCYTES # BLD AUTO: 0.5 K/UL
MONOCYTES NFR BLD: 8.1 %
NEUTROPHILS # BLD AUTO: 4.1 K/UL
NEUTROPHILS NFR BLD: 66 %
PLATELET # BLD AUTO: 138 K/UL
PMV BLD AUTO: 8.4 FL
POTASSIUM SERPL-SCNC: 3.9 MMOL/L
RBC # BLD AUTO: 3.37 M/UL
SODIUM SERPL-SCNC: 141 MMOL/L
WBC # BLD AUTO: 6.18 K/UL

## 2017-02-02 PROCEDURE — 85025 COMPLETE CBC W/AUTO DIFF WBC: CPT

## 2017-02-02 PROCEDURE — 36415 COLL VENOUS BLD VENIPUNCTURE: CPT

## 2017-02-02 PROCEDURE — 80048 BASIC METABOLIC PNL TOTAL CA: CPT

## 2017-02-02 NOTE — TELEPHONE ENCOUNTER
Spoke with pt. Pt states received a phone call from [facility] and thought it was from Dr. Hernandez. Pt was advised Dr. Hernandez did not put a message in epic or informing me he left a message for pt to contact office. Pt was informed could might have been to confirm procedure for tomorrow. She voiced understanding

## 2017-02-03 ENCOUNTER — SURGERY (OUTPATIENT)
Age: 62
End: 2017-02-03

## 2017-02-03 ENCOUNTER — HOSPITAL ENCOUNTER (OUTPATIENT)
Facility: HOSPITAL | Age: 62
Discharge: HOME | End: 2017-02-03
Attending: STUDENT IN AN ORGANIZED HEALTH CARE EDUCATION/TRAINING PROGRAM | Admitting: INTERNAL MEDICINE
Payer: COMMERCIAL

## 2017-02-03 VITALS
RESPIRATION RATE: 16 BRPM | HEIGHT: 68 IN | DIASTOLIC BLOOD PRESSURE: 54 MMHG | SYSTOLIC BLOOD PRESSURE: 116 MMHG | BODY MASS INDEX: 34.86 KG/M2 | WEIGHT: 230 LBS | HEART RATE: 70 BPM | OXYGEN SATURATION: 95 % | TEMPERATURE: 97 F

## 2017-02-03 DIAGNOSIS — E11.41 TYPE 2 DIABETES MELLITUS WITH DIABETIC MONONEUROPATHY, WITHOUT LONG-TERM CURRENT USE OF INSULIN: ICD-10-CM

## 2017-02-03 DIAGNOSIS — C80.0 ADENOCARCINOMA CARCINOMATOSIS: ICD-10-CM

## 2017-02-03 DIAGNOSIS — M19.90 ARTHRITIS: ICD-10-CM

## 2017-02-03 DIAGNOSIS — R91.8 MASS OF MIDDLE LOBE OF RIGHT LUNG: ICD-10-CM

## 2017-02-03 DIAGNOSIS — T45.1X5A CHEMOTHERAPY INDUCED NAUSEA AND VOMITING: ICD-10-CM

## 2017-02-03 DIAGNOSIS — E11.9 TYPE 2 DIABETES MELLITUS WITHOUT COMPLICATION: ICD-10-CM

## 2017-02-03 DIAGNOSIS — K80.40: Primary | ICD-10-CM

## 2017-02-03 DIAGNOSIS — R11.2 CHEMOTHERAPY INDUCED NAUSEA AND VOMITING: ICD-10-CM

## 2017-02-03 DIAGNOSIS — R91.8 PULMONARY INFILTRATE: ICD-10-CM

## 2017-02-03 DIAGNOSIS — N95.0 POST-MENOPAUSAL BLEEDING: ICD-10-CM

## 2017-02-03 DIAGNOSIS — Z51.11 ENCOUNTER FOR ANTINEOPLASTIC CHEMOTHERAPY: ICD-10-CM

## 2017-02-03 LAB
APPEARANCE FLD: NORMAL
BODY FLD TYPE: NORMAL
COLOR FLD: COLORLESS
KOH PREP SPEC: NORMAL
LYMPHOCYTES NFR FLD MANUAL: 19 %
MONOS+MACROS NFR FLD MANUAL: 72 %
NEUTROPHILS NFR FLD MANUAL: 9 %
POCT GLUCOSE: 111 MG/DL (ref 70–110)
POCT GLUCOSE: 114 MG/DL (ref 70–110)
WBC # FLD: 111 /CU MM

## 2017-02-03 PROCEDURE — 87102 FUNGUS ISOLATION CULTURE: CPT

## 2017-02-03 PROCEDURE — 89051 BODY FLUID CELL COUNT: CPT

## 2017-02-03 PROCEDURE — 63600175 PHARM REV CODE 636 W HCPCS: Performed by: INTERNAL MEDICINE

## 2017-02-03 PROCEDURE — 87015 SPECIMEN INFECT AGNT CONCNTJ: CPT

## 2017-02-03 PROCEDURE — 31624 DX BRONCHOSCOPE/LAVAGE: CPT | Mod: RT,,, | Performed by: INTERNAL MEDICINE

## 2017-02-03 PROCEDURE — 88312 SPECIAL STAINS GROUP 1: CPT | Mod: 26,,, | Performed by: PATHOLOGY

## 2017-02-03 PROCEDURE — 88112 CYTOPATH CELL ENHANCE TECH: CPT | Mod: 26,,, | Performed by: PATHOLOGY

## 2017-02-03 PROCEDURE — 25000003 PHARM REV CODE 250: Performed by: INTERNAL MEDICINE

## 2017-02-03 PROCEDURE — 88305 TISSUE EXAM BY PATHOLOGIST: CPT | Performed by: PATHOLOGY

## 2017-02-03 PROCEDURE — 87633 RESP VIRUS 12-25 TARGETS: CPT

## 2017-02-03 PROCEDURE — 87205 SMEAR GRAM STAIN: CPT

## 2017-02-03 PROCEDURE — 87210 SMEAR WET MOUNT SALINE/INK: CPT

## 2017-02-03 PROCEDURE — 31624 DX BRONCHOSCOPE/LAVAGE: CPT | Performed by: INTERNAL MEDICINE

## 2017-02-03 PROCEDURE — 25000003 PHARM REV CODE 250: Performed by: STUDENT IN AN ORGANIZED HEALTH CARE EDUCATION/TRAINING PROGRAM

## 2017-02-03 PROCEDURE — 87116 MYCOBACTERIA CULTURE: CPT

## 2017-02-03 PROCEDURE — 87070 CULTURE OTHR SPECIMN AEROBIC: CPT

## 2017-02-03 PROCEDURE — 88305 TISSUE EXAM BY PATHOLOGIST: CPT | Mod: 26,,, | Performed by: PATHOLOGY

## 2017-02-03 RX ORDER — DIPHENHYDRAMINE HCL 25 MG
25 TABLET ORAL NIGHTLY PRN
COMMUNITY

## 2017-02-03 RX ORDER — LIDOCAINE HYDROCHLORIDE 20 MG/ML
INJECTION, SOLUTION INFILTRATION; PERINEURAL CODE/TRAUMA/SEDATION MEDICATION
Status: COMPLETED | OUTPATIENT
Start: 2017-02-03 | End: 2017-02-03

## 2017-02-03 RX ORDER — LIDOCAINE HYDROCHLORIDE 10 MG/ML
INJECTION INFILTRATION; PERINEURAL CODE/TRAUMA/SEDATION MEDICATION
Status: COMPLETED | OUTPATIENT
Start: 2017-02-03 | End: 2017-02-03

## 2017-02-03 RX ORDER — MIDAZOLAM HYDROCHLORIDE 5 MG/ML
INJECTION INTRAMUSCULAR; INTRAVENOUS CODE/TRAUMA/SEDATION MEDICATION
Status: COMPLETED | OUTPATIENT
Start: 2017-02-03 | End: 2017-02-03

## 2017-02-03 RX ORDER — LIDOCAINE HYDROCHLORIDE 10 MG/ML
1 INJECTION, SOLUTION EPIDURAL; INFILTRATION; INTRACAUDAL; PERINEURAL ONCE
Status: COMPLETED | OUTPATIENT
Start: 2017-02-03 | End: 2017-02-03

## 2017-02-03 RX ORDER — FENTANYL CITRATE 50 UG/ML
INJECTION, SOLUTION INTRAMUSCULAR; INTRAVENOUS CODE/TRAUMA/SEDATION MEDICATION
Status: COMPLETED | OUTPATIENT
Start: 2017-02-03 | End: 2017-02-03

## 2017-02-03 RX ADMIN — BENZOCAINE, BUTAMBEN, AND TETRACAINE HYDROCHLORIDE 1 SPRAY: .028; .004; .004 AEROSOL, SPRAY TOPICAL at 08:02

## 2017-02-03 RX ADMIN — MIDAZOLAM 2 MG: 5 INJECTION INTRAMUSCULAR; INTRAVENOUS at 08:02

## 2017-02-03 RX ADMIN — LIDOCAINE HYDROCHLORIDE 6 ML: 10 INJECTION, SOLUTION INFILTRATION; PERINEURAL at 08:02

## 2017-02-03 RX ADMIN — LIDOCAINE HYDROCHLORIDE 0.2 MG: 10 INJECTION, SOLUTION EPIDURAL; INFILTRATION; INTRACAUDAL; PERINEURAL at 07:02

## 2017-02-03 RX ADMIN — FENTANYL CITRATE 50 MCG: 50 INJECTION, SOLUTION INTRAMUSCULAR; INTRAVENOUS at 08:02

## 2017-02-03 RX ADMIN — LIDOCAINE HYDROCHLORIDE 6 ML: 20 INJECTION, SOLUTION INFILTRATION; PERINEURAL at 08:02

## 2017-02-03 NOTE — INTERVAL H&P NOTE
The patient has been examined and the H&P has been reviewed:    I concur with the findings and no changes have occurred since H&P was written.    Anesthesia/Surgery risks, benefits and alternative options discussed and understood by patient/family.          Active Hospital Problems    Diagnosis  POA    Pulmonary infiltrate [R91.8]  Yes      Resolved Hospital Problems    Diagnosis Date Resolved POA   No resolved problems to display.

## 2017-02-03 NOTE — IP AVS SNAPSHOT
[facility]  [address]  [facility] [address]  Phone: [phone]           Patient Discharge Instructions     Our goal is to set you up for success. This packet includes information on your condition, medications, and your home care. It will help you to care for yourself so you don't get sicker and need to go back to the hospital.     Please ask your nurse if you have any questions.        There are many details to remember when preparing to leave the hospital. Here is what you will need to do:    1. Take your medicine. If you are prescribed medications, review your Medication List in the following pages. You may have new medications to  at the pharmacy and others that you'll need to stop taking. Review the instructions for how and when to take your medications. Talk with your doctor or nurses if you are unsure of what to do.     2. Go to your follow-up appointments. Specific follow-up information is listed in the following pages. Your may be contacted by a transition nurse or clinical provider about future appointments. Be sure we have all of the phone numbers to reach you, if needed. Please contact your provider's office if you are unable to make an appointment.     3. Watch for warning signs. Your doctor or nurse will give you detailed warning signs to watch for and when to call for assistance. These instructions may also include educational information about your condition. If you experience any of warning signs to your health, call your doctor.               [facility]  Unless otherwise directed by your provider, please contact [facility], our nurse care line that is available for 24/7 assistance.     [phone] (toll-free)    Registered nurses in the [facility] provide clinical advisement, health education, appointment booking, and other advisory services.                    ** Verify the list of medication(s) below is accurate and up  to date. Carry this with you in case of emergency. If your medications have changed, please notify your healthcare provider.             Medication List      ASK your doctor about these medications        Additional Info                      BENICAR 5 MG Tab   Refills:  0   Generic drug:  olmesartan      Begin Date    AM    Noon    PM    Bedtime       diphenhydrAMINE 25 mg tablet   Commonly known as:  SOMINEX   Refills:  0   Dose:  25 mg    Instructions:  Take 25 mg by mouth nightly as needed for Insomnia.     Begin Date    AM    Noon    PM    Bedtime       GLUCOPHAGE 500 MG tablet   Refills:  0   Generic drug:  metformin    Instructions:  2 (two) times daily with meals.     Begin Date    AM    Noon    PM    Bedtime       hydrochlorothiazide 12.5 mg capsule   Commonly known as:  MICROZIDE   Refills:  0      Begin Date    AM    Noon    PM    Bedtime       levoFLOXacin 750 MG tablet   Commonly known as:  LEVAQUIN   Quantity:  7 tablet   Refills:  0   Dose:  750 mg    Instructions:  Take 1 tablet (750 mg total) by mouth once daily.     Begin Date    AM    Noon    PM    Bedtime       ondansetron 8 MG Tbdl   Commonly known as:  ZOFRAN-ODT   Quantity:  12 tablet   Refills:  1   Dose:  8 mg    Instructions:  Take 1 tablet (8 mg total) by mouth every 12 (twelve) hours as needed (nausea and vomiting.).     Begin Date    AM    Noon    PM    Bedtime       pregabalin 100 MG capsule   Commonly known as:  LYRICA   Quantity:  60 capsule   Refills:  2   Dose:  100 mg    Instructions:  Take 1 capsule (100 mg total) by mouth 2 (two) times daily.     Begin Date    AM    Noon    PM    Bedtime                  Please bring to all follow up appointments:    1. A copy of your discharge instructions.  2. All medicines you are currently taking in their original bottles.  3. Identification and insurance card.    Please arrive 15 minutes ahead of scheduled appointment time.    Please call 24 hours in advance if you must reschedule your  appointment and/or time.            Discharge Instructions       Discharge Instructions for Bronchoscopy    Chest X-ray completed and MD notified.    ACTIVITY LEVEL:  If you received sedation or an anesthetic, you may feel sleepy for several hours. Do not drive, operate machinery, make critical decisions, or perform activities that require coordination or balance until tomorrow morning. Please have a responsible person stay with you for at least two (2) hours after you leave the hospital.     DIET:  Do not eat or drink anything until ***. Once you can drink clear liquids without coughing, you can resume your regular diet.     WHAT you may expect over the next 24 hours:  · You may experience a low grade fever.  · You may cough up streaks of blood.  · Take Tylenol as directed for comfort/fever.    Additional Instructions:  · Do not take Aspirin, ibuprofen, naproxen, or any medications containing these items for *** days after the bronchoscopy.  · If you normally take Coumadin or aspirin, you may restart on ***.     COME TO THE EMERGENCY DEPARTMENT IF:  · You cough up more than one (1) tablespoon of blood.  · You have fever over 101F (38.4C) for more than one evening.  · You experience shortness of breath that is of new onset, or that is increased from your usual baseline.  · You experience chest pain.  · You have chills.    RETURN APPOINTMENT:  Follow up as directed.   Comments: ***.    FOR EMERGENCIES:    If any unusual problems or difficulties occur, contact ***  or the resident at *** or at the Clinic office, ***.        Admission Information     Date & Time Provider Department CSN    2/3/2017  6:32 AM Thomas Johnson MD [facility] [id number]      Care Providers     Provider Role Specialty Primary office phone    Thomas Johnson MD Attending Provider Pulmonary Disease [phone]    [facility] Diagnostic Provider Surgeon  -- Number not on file      Your Vitals Were     BP Pulse Temp Resp  "Height Weight    119/63 80 97.2 °F (36.2 °C) (Temporal) 16 5' 8" (1.727 m) 104.3 kg (230 lb)    Last Period SpO2 BMI          06/20/2014 95% 34.97 kg/m2        Recent Lab Values     No lab values to display.      Pending Labs     Order Current Status    Medical Cytology Collected (02/03/17 0828)    AFB Culture & Smear In process    Culture, Respiratory In process    Fungus culture In process    YAZMIN prep In process    Respiratory virus antigens panel In process    WBC & Diff,Body Fluid Bronchial Wash In process      Allergies as of 2/3/2017        Reactions    No Known Allergies       Advance Directives     An advance directive is a document which, in the event you are no longer able to make decisions for yourself, tells your healthcare team what kind of treatment you do or do not want to receive, or who you would like to make those decisions for you.  If you do not currently have an advance directive, [facility] encourages you to create one.  For more information call:  [phone]), [phone] ([phone]),  or log on to [email or web address].        Smoking Cessation     If you would like to quit smoking:   You may be eligible for free services if you are a [place] resident and started smoking cigarettes before September 1, 1988.  Call the Smoking Cessation Trust ([facility]) toll free at (150) 361-2613 or (347) 058-6556.   Call -295-QUIT-NOW if you do not meet the above criteria.            Language Assistance Services     ATTENTION: Language assistance services are available, free of charge. Please call [phone].      ATENCIÓN: Si habla español, tiene a michelle disposición servicios gratuitos de asistencia lingüística. Llame al [phone].     CHÚ Ý: N?u b?n nói Ti?ng Vi?t, có các d?ch v? h? tr? ngôn ng? mi?n phí dành cho b?n. G?i s? [phone].        Diabetes Discharge Instructions                                   [facility] Sign-Up     Activating your [facility] account is as easy as " 1-2-3!     1) Visit [email or web address], select Sign Up Now, enter this activation code and your date of birth, then select Next.  [email or web address]  Expires: 2/25/2017 10:06 AM      2) Create a username and password to use when you visit [facility] in the future and select a security question in case you lose your password and select Next.    3) Enter your e-mail address and click Sign Up!    Additional Information  If you have questions, please e-mail [email or web address] or call [phone] to talk to our [employer] staff. Remember, [facility] [facility] is NOT to be used for urgent needs. For medical emergencies, dial 911.          [facility] complies with applicable Federal civil rights laws and does not discriminate on the basis of race, color, national origin, age, disability, or sex.

## 2017-02-03 NOTE — SEDATION DOCUMENTATION
H and P updated-yes  Anesthesia Plan:  conscious sedation   ASA verified-yes  Airway exam performed-yes  Personal or Family history of anesthesia complications-No  Consent signed and witnessed, Yahaira Braswell RN

## 2017-02-03 NOTE — PLAN OF CARE
Problem: Patient Care Overview  Goal: Plan of Care Review  Outcome: Outcome(s) achieved Date Met:  02/03/17  Awake and alert. VSS. Denies pain or nausea. Tolerating liquids well. DC instructions given to patient and family and they verbalize understanding.

## 2017-02-03 NOTE — H&P (VIEW-ONLY)
Pulmonary Clinic: Initial Consultation      HPI     62 y/o female with a PMH significant for a 30 pack year history of tobacco and primary peritoneal carcinoma/adenocarcinoma carinomasositis (diagnosed 11/16, actively receiving chemotherapy/debulking) wh presents as a referral by her Gyn/Onc (Dr. Hernandez) after a interval surveilance CT showed a new lesion in the RML of the lung (1/25/17, concern for metastastic disease vs. infection)    In clinic today without any fevers, chills, cough, night sweats.   She endorses previous fevers, productive cough prior to CT but now cleared (took tylenol with relief, not on antibitiocs).  She has appreicated at 20 pound weight loss over the last 6 months.         Review of Systems      Negative, except per HPI    Objective:       Vitals:    01/30/17 1504   BP: 106/60   Pulse: 91       Physical Exam     Gen: AAOx3, NAD  Head: NC/AT  Eyes: PERRLA, EOMI  Ears: no discharge  Nose: no nasal polyps  Throat: no tonsillar exudate  Neck: no masses  Lungs: CTAB, no wheezing, rhonci or rales  Cardiac: S1/S2=normal, no murmurs, rubs or gallops  Abdomen: soft, nontender, distended, BS+  Extremities: no edema  Skin: no rashes          Assessment/Plan       62 y/o female with a PMH significant for a 30 pack year history of tobacco and primary peritoneal carcinoma/adenocarcinoma carinomasositis (diagnosed 11/16, actively receiving chemotherapy/debulking) wh presents as a referral by her Gyn/Onc (Dr. Hernandez) after a interval surveilance CT showed a new lesion in the RML of the lung (1/25/17, concern for metastastic disease vs. infection)    In clinic today without any fevers, chills, cough, night sweats.   She endorses previous fevers, productive cough prior to CT but now cleared (took tylenol with relief, not on antibitiocs).  She has appreicated at 20 pound weight loss over the last 6 months.     RML Lung Lesion on CT (1/25/17)    -Most likely infectious process (as patient with  fevers/chills/produtive cough prior to CT one week ago, not symptomatic in clinic)   -However no imaging to compare to and do not want to delay anticipated surgery/debulking  -Will schedule a bronchoscopy for Friday: 2/3/17 and prescribe a 7 day course of Levofloxacin in the interim    Chris Montejo MD, MSc  Pulmonary/Critical Care Fellow

## 2017-02-06 LAB
BACTERIA SPEC AEROBE CULT: NO GROWTH
GRAM STN SPEC: NORMAL

## 2017-02-07 LAB
RVP - ADENOVIRUS: NORMAL
RVP - HUMAN METAPNEUMOVIRUS (HMPV): NORMAL
RVP - INFLUENZA A SUBTYPE H1 - (SEASONAL): NORMAL
RVP - INFLUENZA A SUBTYPE H3 - (SEASONAL): NORMAL
RVP - INFLUENZA A: NORMAL
RVP - INFLUENZA B: NORMAL
RVP - PARAINFLUENZA VIRUS 1: NORMAL
RVP - PARAINFLUENZA VIRUS 2: NORMAL
RVP - PARAINFLUENZA VIRUS 3: NORMAL
RVP - RESPIRATORY SYNCTIAL VIRUS (RSV) A: NORMAL
RVP - RESPIRATORY SYNCTIAL VIRUS (RSV) B: NORMAL
RVP - RESPIRATORY VIRAL PANEL, SOURCE: NORMAL
RVP - RHINOVIRUS: NORMAL

## 2017-02-09 ENCOUNTER — TELEPHONE (OUTPATIENT)
Dept: GYNECOLOGIC ONCOLOGY | Facility: CLINIC | Age: 62
End: 2017-02-09

## 2017-02-09 NOTE — TELEPHONE ENCOUNTER
Patient informed of bronchoscopy results which are negative. Will plan for interval debulking.

## 2017-02-10 ENCOUNTER — TELEPHONE (OUTPATIENT)
Dept: GYNECOLOGIC ONCOLOGY | Facility: CLINIC | Age: 62
End: 2017-02-10

## 2017-02-10 DIAGNOSIS — C80.0 ADENOCARCINOMA CARCINOMATOSIS: Primary | ICD-10-CM

## 2017-02-13 ENCOUNTER — HOSPITAL ENCOUNTER (OUTPATIENT)
Dept: CARDIOLOGY | Facility: OTHER | Age: 62
Discharge: HOME | End: 2017-02-13
Attending: OBSTETRICS & GYNECOLOGY
Payer: COMMERCIAL

## 2017-02-13 DIAGNOSIS — C80.0 ADENOCARCINOMA CARCINOMATOSIS: ICD-10-CM

## 2017-02-13 PROCEDURE — 93005 ELECTROCARDIOGRAM TRACING: CPT

## 2017-02-13 PROCEDURE — 93010 ELECTROCARDIOGRAM REPORT: CPT | Mod: ,,, | Performed by: INTERNAL MEDICINE

## 2017-02-15 NOTE — DISCHARGE SUMMARY
[facility]  Pulmonary  Discharge Summary      Patient Name: Beulah Guillaume  MRN: [id number]  Admission Date: 2/3/2017  Hospital Length of Stay: 0 days  Discharge Date and Time: 2/3/2017  9:53 AM  Attending Physician: No att. providers found   Discharging Provider: Thomas Johnson MD  Primary Care Provider: Venkat Lilly MD  Hospital Medicine Team: Networked reference to record PCT  Thomas Johnson MD    HPI:   60 y/o female with a PMH significant for a 30 pack year history of tobacco and primary peritoneal carcinoma/adenocarcinoma carinomasositis (diagnosed 11/16, actively receiving chemotherapy/debulking) wh presents as a referral by her Gyn/Onc (Dr. Hernandez) after a interval surveilance CT showed a new lesion in the RML of the lung (1/25/17, concern for metastastic disease vs. infection)     In clinic today without any fevers, chills, cough, night sweats. She endorses previous fevers, productive cough prior to CT but now cleared (took tylenol with relief, not on antibitiocs). She has appreicated at 20 pound weight loss over the last 6 months.          Procedure(s) (LRB):  BRONCHOSCOPY (N/A)      Indwelling Lines/Drains at time of discharge:   Lines/Drains/Airways          No matching active lines, drains, or airways        Hospital Course:   Patient had bronchoscopy performed without complications. DC home.      Consults:     Significant Diagnostic Studies: Bronchoscopy: Completed    Pending Diagnostic Studies:     None        Final Active Diagnoses:    Diagnosis Date Noted POA    Pulmonary infiltrate [R91.8] 02/03/2017 Yes      Problems Resolved During this Admission:    Diagnosis Date Noted Date Resolved POA      No new Assessment & Plan notes have been filed under this hospital service since the last note was generated.  Service: Hospital Medicine      Discharged Condition: good    Disposition: Home or Self Care    Follow Up:    Patient Instructions:   No discharge procedures on  file.  Medications:  No new medications  Time spent on the discharge of patient: 20 minutes    Thomas Johnson MD  Department of Pulmonary  [facility]

## 2017-02-16 ENCOUNTER — TELEPHONE (OUTPATIENT)
Dept: GYNECOLOGIC ONCOLOGY | Facility: CLINIC | Age: 62
End: 2017-02-16

## 2017-02-16 ENCOUNTER — RESEARCH ENCOUNTER (OUTPATIENT)
Dept: RESEARCH | Facility: HOSPITAL | Age: 62
End: 2017-02-16

## 2017-02-16 ENCOUNTER — OFFICE VISIT (OUTPATIENT)
Dept: GYNECOLOGIC ONCOLOGY | Facility: CLINIC | Age: 62
End: 2017-02-16
Payer: COMMERCIAL

## 2017-02-16 VITALS
HEIGHT: 68 IN | DIASTOLIC BLOOD PRESSURE: 58 MMHG | BODY MASS INDEX: 34.4 KG/M2 | WEIGHT: 227 LBS | HEART RATE: 84 BPM | SYSTOLIC BLOOD PRESSURE: 138 MMHG

## 2017-02-16 DIAGNOSIS — C80.0 ADENOCARCINOMA CARCINOMATOSIS: Primary | ICD-10-CM

## 2017-02-16 PROCEDURE — 99214 OFFICE O/P EST MOD 30 MIN: CPT | Mod: S$GLB,,, | Performed by: OBSTETRICS & GYNECOLOGY

## 2017-02-16 PROCEDURE — 99999 PR PBB SHADOW E&M-EST. PATIENT-LVL III: CPT | Mod: PBBFAC,,, | Performed by: OBSTETRICS & GYNECOLOGY

## 2017-02-16 RX ORDER — LIDOCAINE HYDROCHLORIDE 10 MG/ML
1 INJECTION, SOLUTION EPIDURAL; INFILTRATION; INTRACAUDAL; PERINEURAL ONCE
Status: CANCELLED | OUTPATIENT
Start: 2017-02-16 | End: 2017-02-16

## 2017-02-16 NOTE — TELEPHONE ENCOUNTER
Returned phone call to Ms. Mata with [employer] at [phone]. Left message for Ms. Mata to contact office. Phone number given in message

## 2017-02-16 NOTE — PROGRESS NOTES
Pt alone was approached by [employer] in clinic regarding participation in [employer] program (IRB#[id number].C). Pt was agreeable.   The ICF was reviewed with pt. The discussion included:   - participation is voluntary;   - pt can change her mind about participating at any time;   - if she changes her mind about participation, she can call us at contact info in ICF and we will discard samples remaining;   - samples that have been used prior to her notification will still be included in research;   - specimens collected will include blood, urine and tissue;   - blood and urine will be collected pre-operatively if possible;   - tissue will be collected from Pathology after routine tests have been conducted;   - Dr. Hernandez will perform procedure per his usual protocol - participation in [employer] program will not change amount of tissue removed;   - specimens will be stored with unique code that can only be linked to pt by [employer] staff;   - all medical information released to researchers will be stripped of identifiers;   - samples will not be released to outside researchers unless approved by internal committee;   - there is a small risk of loss of confidentiality, but we make every effort to ensure privacy;   - no other physical risks outside of those involved in standard of care procedure.     Pt did not have any questions. Pt willingly and independently signed ICF for [employer]. A copy of signed ICF was given to pt with instructions to call with any questions that may arise or if she should change her mind regarding participation in [employer] program.

## 2017-02-16 NOTE — MR AVS SNAPSHOT
[facility]  [address]  [place] [address]  Phone: [phone]                  Beulah Guillaume   2017 8:45 AM   Office Visit    Description:  Female : 1955   Provider:  Rey Hernandez MD   Department:  [facility]           Reason for Visit     Sign consents           Diagnoses this Visit        Comments    Adenocarcinoma carcinomatosis    -  Primary            To Do List           Your Future Surgeries/Procedures     2017   Surgery with Rey Hernandez MD   [facility]    [address] [facility] [address] [phone]              Goals (5 Years of Data)     None      [facility]     [facility] Care [facility] -  Assistance  Registered nurses in the [facility] provide clinical advisement, health education, appointment booking, and other advisory services.  Call for this free service at [phone].             Medications           Message regarding Medications     Verify the changes and/or additions to your medication regime listed below are the same as discussed with your clinician today.  If any of these changes or additions are incorrect, please notify your healthcare provider.             Verify that the below list of medications is an accurate representation of the medications you are currently taking.  If none reported, the list may be blank. If incorrect, please contact your healthcare provider. Carry this list with you in case of emergency.           Current Medications     diphenhydrAMINE (SOMINEX) 25 mg tablet Take 25 mg by mouth nightly as needed for Insomnia.    hydrochlorothiazide (MICROZIDE) 12.5 mg capsule     metformin (GLUCOPHAGE) 500 MG tablet 2 (two) times daily with meals.     olmesartan (BENICAR) 5 MG Tab     ondansetron (ZOFRAN-ODT) 8 MG TbDL Take 1 tablet (8 mg total) by mouth every 12 (twelve) hours as needed (nausea and vomiting.).     "pregabalin (LYRICA) 100 MG capsule Take 1 capsule (100 mg total) by mouth 2 (two) times daily.           Clinical Reference Information           Your Vitals Were     BP Pulse Height Weight Last Period BMI    138/58 84 5' 8" (1.727 m) 103 kg (227 lb) 06/20/2014 34.52 kg/m2      Blood Pressure          Most Recent Value    BP  (!)  138/58      Allergies as of 2/16/2017     No Known Allergies      Immunizations Administered on Date of Encounter - 2/16/2017     None      [facility] Sign-Up     Activating your [facility] account is as easy as 1-2-3!     1) Visit [email or web address], select Sign Up Now, enter this activation code and your date of birth, then select Next.  [email or web address]  Expires: 2/25/2017 10:06 AM      2) Create a username and password to use when you visit [facility] in the future and select a security question in case you lose your password and select Next.    3) Enter your e-mail address and click Sign Up!    Additional Information  If you have questions, please e-mail [email or web address] or call [phone] to talk to our [facility] staff. Remember, [facility] is NOT to be used for urgent needs. For medical emergencies, dial 911.         Language Assistance Services     ATTENTION: Language assistance services are available, free of charge. Please call [phone].      ATENCIÓN: Si habla español, tiene a michelle disposición servicios gratuitos de asistencia lingüística. Llame al [phone].     [facility] Ý: N?u b?n nói Ti?ng Vi?t, có các d?ch v? h? tr? ngôn ng? mi?n phí dành cho b?n. G?i s? [phone].         [facility] complies with applicable Federal civil rights laws and does not discriminate on the basis of race, color, national origin, age, disability, or sex.        "

## 2017-02-16 NOTE — PROGRESS NOTES
Subjective:       Patient ID: Beulah Guillaume is a 61 y.o. female.    Chief Complaint: Sign consents    HPI     Patient comes in today to sign consents for interval debulking with DIANNA/BSO for primary peritoneal cancer.  has declined from 259 to 75 after 3 cycles of neoadjuvant taxol and carboplatin.   She has recurring ascites. Recent paracentesis was done with 5 liters of ascitic fluid removed. Cytology was positive for malignant cells.  Recent CT scan of the chest, abdomen and pelvis showed a somewhat nodular change in the right middle lobe measuring about 5.6 cm in maximum diameter. The amount of omental caking and thickening in the periumbilical area has decreased although there is still a little thickening of the abdominal wall just to the right of the umbilicus.  Uterus is enlarged with multiple calcified fibroids.  No new masses are identified.  Seen by pulmonary. Bronchoscopy was negative with negative cytology. Thought to be infectious.       Treatment history:  Taken to OR by Dr. Cole on 16 for lap cecille and found to have carcinomatosis. CT scan of abd and pelvis was consistent with carcinomatosis.  was 259 and CA 19-9 was 294. She also has been having PMB.    Past Medical History   Diagnosis Date    Abnormal liver CT     Abnormal Pap smear      1985    Arthritis 2016    Chemotherapy induced nausea and vomiting 2016    Diabetes mellitus     Hypertension     Mass of middle lobe of right lung 2017    Post-menopausal bleeding 2016    Type 2 diabetes mellitus with diabetic mononeuropathy, without long-term current use of insulin 2017    Type 2 diabetes mellitus without complication 2016     Past Surgical History   Procedure Laterality Date    Colposcopy       section       x 2    Dilation and curettage of uterus       for Polyps     Family History   Problem Relation Age of Onset    Prostate cancer Father     No Known Problems  Mother     Diabetes Paternal Uncle     Breast cancer Neg Hx     Colon cancer Neg Hx     Hypertension Neg Hx     Stroke Neg Hx      Social History   Substance Use Topics    Smoking status: Former Smoker     Packs/day: 1.50     Years: 30.00     Types: Cigarettes     Start date: 10/20/2000    Smokeless tobacco: Never Used    Alcohol use 1.2 oz/week     2 Glasses of wine per week      Comment: rarely     Review of patient's allergies indicates:   Allergen Reactions    No known allergies        Current Outpatient Prescriptions:     diphenhydrAMINE (SOMINEX) 25 mg tablet, Take 25 mg by mouth nightly as needed for Insomnia., Disp: , Rfl:     hydrochlorothiazide (MICROZIDE) 12.5 mg capsule, , Disp: , Rfl:     metformin (GLUCOPHAGE) 500 MG tablet, 2 (two) times daily with meals. , Disp: , Rfl:     olmesartan (BENICAR) 5 MG Tab, , Disp: , Rfl:     ondansetron (ZOFRAN-ODT) 8 MG TbDL, Take 1 tablet (8 mg total) by mouth every 12 (twelve) hours as needed (nausea and vomiting.)., Disp: 12 tablet, Rfl: 1    pregabalin (LYRICA) 100 MG capsule, Take 1 capsule (100 mg total) by mouth 2 (two) times daily., Disp: 60 capsule, Rfl: 2    Review of Systems   Constitutional: Negative for chills, fatigue and fever.   Respiratory: Negative for cough, shortness of breath and wheezing.    Cardiovascular: Negative for chest pain, palpitations and leg swelling.   Gastrointestinal: Positive for abdominal distention. Negative for abdominal pain, constipation, diarrhea, nausea and vomiting.   Genitourinary: Negative for difficulty urinating, dysuria, frequency, genital sores, hematuria, urgency, vaginal bleeding, vaginal discharge and vaginal pain.   Musculoskeletal: Negative for gait problem.   Neurological: Negative for weakness and numbness.   Hematological: Negative for adenopathy. Does not bruise/bleed easily.   Psychiatric/Behavioral: The patient is not nervous/anxious.        Objective:       Visit Vitals    BP (!) 138/58     "Pulse 84    Ht 5' 8" (1.727 m)    Wt 103 kg (227 lb)    LMP 06/20/2014    BMI 34.52 kg/m2       Physical Exam   Constitutional: She is oriented to person, place, and time. She appears well-developed and well-nourished.   HENT:   Head: Normocephalic and atraumatic.   Eyes: No scleral icterus.   Neck: No tracheal deviation present. No thyromegaly present.   Cardiovascular: Normal rate and regular rhythm.    Pulmonary/Chest: Effort normal and breath sounds normal.   Abdominal: Soft. She exhibits no distension and no mass. There is no tenderness. There is no rebound and no guarding. No hernia.   Genitourinary:   Genitourinary Comments: Not performed.    Musculoskeletal: She exhibits no edema or tenderness.   Lymphadenopathy:     She has no cervical adenopathy.   Neurological: She is alert and oriented to person, place, and time.   Skin: Skin is warm and dry.   Psychiatric: She has a normal mood and affect. Her behavior is normal. Judgment and thought content normal.       Assessment:       1. Adenocarcinoma carcinomatosis        Plan:   Adenocarcinoma carcinomatosis  Consents signed for open DIANNA/BSO, omentectomy and debulking.   Questions answered.     Preop orders placed.         "

## 2017-02-20 ENCOUNTER — ANESTHESIA EVENT (OUTPATIENT)
Dept: SURGERY | Facility: HOSPITAL | Age: 62
DRG: 357 | End: 2017-02-20
Payer: COMMERCIAL

## 2017-02-20 ENCOUNTER — HOSPITAL ENCOUNTER (INPATIENT)
Facility: HOSPITAL | Age: 62
LOS: 3 days | Discharge: HOME | DRG: 357 | End: 2017-02-23
Attending: OBSTETRICS & GYNECOLOGY | Admitting: OBSTETRICS & GYNECOLOGY
Payer: COMMERCIAL

## 2017-02-20 ENCOUNTER — ANESTHESIA (OUTPATIENT)
Dept: SURGERY | Facility: HOSPITAL | Age: 62
DRG: 357 | End: 2017-02-20
Payer: COMMERCIAL

## 2017-02-20 DIAGNOSIS — Z98.890 S/P EXPLORATORY LAPAROTOMY: Primary | ICD-10-CM

## 2017-02-20 DIAGNOSIS — C80.0 ADENOCARCINOMA CARCINOMATOSIS: ICD-10-CM

## 2017-02-20 LAB
ABO + RH BLD: NORMAL
BLD GP AB SCN CELLS X3 SERPL QL: NORMAL
POCT GLUCOSE: 221 MG/DL (ref 70–110)
POCT GLUCOSE: 92 MG/DL (ref 70–110)
POCT GLUCOSE: 96 MG/DL (ref 70–110)

## 2017-02-20 PROCEDURE — 63600175 PHARM REV CODE 636 W HCPCS: Performed by: NURSE ANESTHETIST, CERTIFIED REGISTERED

## 2017-02-20 PROCEDURE — 88305 TISSUE EXAM BY PATHOLOGIST: CPT | Mod: 26,,,

## 2017-02-20 PROCEDURE — 49999 UNLISTED PX ABD PERTM&OMN: CPT | Mod: ,,, | Performed by: OBSTETRICS & GYNECOLOGY

## 2017-02-20 PROCEDURE — 27000221 HC OXYGEN, UP TO 24 HOURS

## 2017-02-20 PROCEDURE — 25000003 PHARM REV CODE 250: Performed by: NURSE ANESTHETIST, CERTIFIED REGISTERED

## 2017-02-20 PROCEDURE — 27100025 HC TUBING, SET FLUID WARMER: Performed by: NURSE ANESTHETIST, CERTIFIED REGISTERED

## 2017-02-20 PROCEDURE — 86900 BLOOD TYPING SEROLOGIC ABO: CPT

## 2017-02-20 PROCEDURE — 71000033 HC RECOVERY, INTIAL HOUR: Performed by: OBSTETRICS & GYNECOLOGY

## 2017-02-20 PROCEDURE — 0DBS0ZX EXCISION OF GREATER OMENTUM, OPEN APPROACH, DIAGNOSTIC: ICD-10-PCS | Performed by: OBSTETRICS & GYNECOLOGY

## 2017-02-20 PROCEDURE — D9220A PRA ANESTHESIA: Mod: ANES,,, | Performed by: ANESTHESIOLOGY

## 2017-02-20 PROCEDURE — 25000003 PHARM REV CODE 250: Performed by: OBSTETRICS & GYNECOLOGY

## 2017-02-20 PROCEDURE — 25000003 PHARM REV CODE 250: Performed by: ANESTHESIOLOGY

## 2017-02-20 PROCEDURE — 86920 COMPATIBILITY TEST SPIN: CPT

## 2017-02-20 PROCEDURE — 36000706: Performed by: OBSTETRICS & GYNECOLOGY

## 2017-02-20 PROCEDURE — 71000039 HC RECOVERY, EACH ADD'L HOUR: Performed by: OBSTETRICS & GYNECOLOGY

## 2017-02-20 PROCEDURE — 88305 TISSUE EXAM BY PATHOLOGIST: CPT

## 2017-02-20 PROCEDURE — 37000008 HC ANESTHESIA 1ST 15 MINUTES: Performed by: OBSTETRICS & GYNECOLOGY

## 2017-02-20 PROCEDURE — P9045 ALBUMIN (HUMAN), 5%, 250 ML: HCPCS | Performed by: NURSE ANESTHETIST, CERTIFIED REGISTERED

## 2017-02-20 PROCEDURE — 63600175 PHARM REV CODE 636 W HCPCS: Performed by: ANESTHESIOLOGY

## 2017-02-20 PROCEDURE — D9220A PRA ANESTHESIA: Mod: CRNA,,, | Performed by: NURSE ANESTHETIST, CERTIFIED REGISTERED

## 2017-02-20 PROCEDURE — 86901 BLOOD TYPING SEROLOGIC RH(D): CPT

## 2017-02-20 PROCEDURE — 0D9W0ZZ DRAINAGE OF PERITONEUM, OPEN APPROACH: ICD-10-PCS | Performed by: OBSTETRICS & GYNECOLOGY

## 2017-02-20 PROCEDURE — 63600175 PHARM REV CODE 636 W HCPCS: Performed by: OBSTETRICS & GYNECOLOGY

## 2017-02-20 PROCEDURE — 88342 IMHCHEM/IMCYTCHM 1ST ANTB: CPT | Mod: 26,,,

## 2017-02-20 PROCEDURE — 20600001 HC STEP DOWN PRIVATE ROOM

## 2017-02-20 PROCEDURE — 36000707: Performed by: OBSTETRICS & GYNECOLOGY

## 2017-02-20 PROCEDURE — 37000009 HC ANESTHESIA EA ADD 15 MINS: Performed by: OBSTETRICS & GYNECOLOGY

## 2017-02-20 PROCEDURE — 94799 UNLISTED PULMONARY SVC/PX: CPT

## 2017-02-20 PROCEDURE — 88341 IMHCHEM/IMCYTCHM EA ADD ANTB: CPT | Mod: 26,,,

## 2017-02-20 RX ORDER — PROPOFOL 10 MG/ML
VIAL (ML) INTRAVENOUS
Status: DISCONTINUED | OUTPATIENT
Start: 2017-02-20 | End: 2017-02-20

## 2017-02-20 RX ORDER — HYDROMORPHONE HYDROCHLORIDE 1 MG/ML
INJECTION, SOLUTION INTRAMUSCULAR; INTRAVENOUS; SUBCUTANEOUS
Status: DISPENSED
Start: 2017-02-20 | End: 2017-02-21

## 2017-02-20 RX ORDER — SODIUM CHLORIDE 9 MG/ML
INJECTION, SOLUTION INTRAVENOUS CONTINUOUS
Status: DISCONTINUED | OUTPATIENT
Start: 2017-02-20 | End: 2017-02-21

## 2017-02-20 RX ORDER — ONDANSETRON 2 MG/ML
4 INJECTION INTRAMUSCULAR; INTRAVENOUS EVERY 6 HOURS PRN
Status: DISCONTINUED | OUTPATIENT
Start: 2017-02-20 | End: 2017-02-23

## 2017-02-20 RX ORDER — SODIUM CHLORIDE 0.9 % (FLUSH) 0.9 %
3 SYRINGE (ML) INJECTION EVERY 8 HOURS
Status: DISCONTINUED | OUTPATIENT
Start: 2017-02-20 | End: 2017-02-20 | Stop reason: HOSPADM

## 2017-02-20 RX ORDER — DEXAMETHASONE SODIUM PHOSPHATE 4 MG/ML
INJECTION, SOLUTION INTRA-ARTICULAR; INTRALESIONAL; INTRAMUSCULAR; INTRAVENOUS; SOFT TISSUE
Status: DISCONTINUED | OUTPATIENT
Start: 2017-02-20 | End: 2017-02-20

## 2017-02-20 RX ORDER — ROCURONIUM BROMIDE 10 MG/ML
INJECTION, SOLUTION INTRAVENOUS
Status: DISCONTINUED | OUTPATIENT
Start: 2017-02-20 | End: 2017-02-20

## 2017-02-20 RX ORDER — HYDROMORPHONE HYDROCHLORIDE 2 MG/ML
INJECTION, SOLUTION INTRAMUSCULAR; INTRAVENOUS; SUBCUTANEOUS
Status: DISCONTINUED | OUTPATIENT
Start: 2017-02-20 | End: 2017-02-20

## 2017-02-20 RX ORDER — ACETAMINOPHEN 10 MG/ML
INJECTION, SOLUTION INTRAVENOUS
Status: DISCONTINUED | OUTPATIENT
Start: 2017-02-20 | End: 2017-02-20

## 2017-02-20 RX ORDER — SODIUM CHLORIDE 0.9 % (FLUSH) 0.9 %
3 SYRINGE (ML) INJECTION
Status: DISCONTINUED | OUTPATIENT
Start: 2017-02-20 | End: 2017-02-23 | Stop reason: HOSPADM

## 2017-02-20 RX ORDER — LIDOCAINE HCL/PF 100 MG/5ML
SYRINGE (ML) INTRAVENOUS
Status: DISCONTINUED | OUTPATIENT
Start: 2017-02-20 | End: 2017-02-20

## 2017-02-20 RX ORDER — LIDOCAINE HYDROCHLORIDE 10 MG/ML
1 INJECTION, SOLUTION EPIDURAL; INFILTRATION; INTRACAUDAL; PERINEURAL ONCE
Status: COMPLETED | OUTPATIENT
Start: 2017-02-20 | End: 2017-02-20

## 2017-02-20 RX ORDER — HYDROMORPHONE HYDROCHLORIDE 1 MG/ML
0.5 INJECTION, SOLUTION INTRAMUSCULAR; INTRAVENOUS; SUBCUTANEOUS
Status: DISCONTINUED | OUTPATIENT
Start: 2017-02-20 | End: 2017-02-23 | Stop reason: HOSPADM

## 2017-02-20 RX ORDER — MULTIVITAMIN
1 TABLET ORAL DAILY
COMMUNITY

## 2017-02-20 RX ORDER — MIDAZOLAM HYDROCHLORIDE 1 MG/ML
INJECTION, SOLUTION INTRAMUSCULAR; INTRAVENOUS
Status: DISCONTINUED | OUTPATIENT
Start: 2017-02-20 | End: 2017-02-20

## 2017-02-20 RX ORDER — INSULIN ASPART 100 [IU]/ML
0-5 INJECTION, SOLUTION INTRAVENOUS; SUBCUTANEOUS EVERY 6 HOURS PRN
Status: DISCONTINUED | OUTPATIENT
Start: 2017-02-20 | End: 2017-02-23 | Stop reason: HOSPADM

## 2017-02-20 RX ORDER — GLYCOPYRROLATE 0.2 MG/ML
INJECTION INTRAMUSCULAR; INTRAVENOUS
Status: DISCONTINUED | OUTPATIENT
Start: 2017-02-20 | End: 2017-02-20

## 2017-02-20 RX ORDER — ALBUMIN HUMAN 50 G/1000ML
SOLUTION INTRAVENOUS CONTINUOUS PRN
Status: DISCONTINUED | OUTPATIENT
Start: 2017-02-20 | End: 2017-02-20

## 2017-02-20 RX ORDER — ONDANSETRON 2 MG/ML
4 INJECTION INTRAMUSCULAR; INTRAVENOUS DAILY PRN
Status: DISCONTINUED | OUTPATIENT
Start: 2017-02-20 | End: 2017-02-20 | Stop reason: HOSPADM

## 2017-02-20 RX ORDER — ACETAMINOPHEN 10 MG/ML
1000 INJECTION, SOLUTION INTRAVENOUS EVERY 8 HOURS
Status: DISCONTINUED | OUTPATIENT
Start: 2017-02-20 | End: 2017-02-21

## 2017-02-20 RX ORDER — SODIUM CHLORIDE 9 MG/ML
INJECTION, SOLUTION INTRAVENOUS CONTINUOUS
Status: DISCONTINUED | OUTPATIENT
Start: 2017-02-20 | End: 2017-02-20

## 2017-02-20 RX ORDER — SIMETHICONE 80 MG
1 TABLET,CHEWABLE ORAL 3 TIMES DAILY PRN
Status: DISCONTINUED | OUTPATIENT
Start: 2017-02-20 | End: 2017-02-23 | Stop reason: HOSPADM

## 2017-02-20 RX ORDER — HYDROMORPHONE HYDROCHLORIDE 1 MG/ML
0.2 INJECTION, SOLUTION INTRAMUSCULAR; INTRAVENOUS; SUBCUTANEOUS EVERY 5 MIN PRN
Status: DISCONTINUED | OUTPATIENT
Start: 2017-02-20 | End: 2017-02-20 | Stop reason: HOSPADM

## 2017-02-20 RX ORDER — DOCUSATE SODIUM 100 MG/1
100 CAPSULE, LIQUID FILLED ORAL 2 TIMES DAILY PRN
Status: DISCONTINUED | OUTPATIENT
Start: 2017-02-20 | End: 2017-02-23 | Stop reason: HOSPADM

## 2017-02-20 RX ORDER — GLUCAGON 1 MG
1 KIT INJECTION
Status: DISCONTINUED | OUTPATIENT
Start: 2017-02-20 | End: 2017-02-23 | Stop reason: HOSPADM

## 2017-02-20 RX ORDER — ONDANSETRON 2 MG/ML
INJECTION INTRAMUSCULAR; INTRAVENOUS
Status: DISCONTINUED | OUTPATIENT
Start: 2017-02-20 | End: 2017-02-20

## 2017-02-20 RX ORDER — FENTANYL CITRATE 50 UG/ML
INJECTION, SOLUTION INTRAMUSCULAR; INTRAVENOUS
Status: DISCONTINUED | OUTPATIENT
Start: 2017-02-20 | End: 2017-02-20

## 2017-02-20 RX ORDER — NEOSTIGMINE METHYLSULFATE 1 MG/ML
INJECTION, SOLUTION INTRAVENOUS
Status: DISCONTINUED | OUTPATIENT
Start: 2017-02-20 | End: 2017-02-20

## 2017-02-20 RX ORDER — DIPHENHYDRAMINE HCL 25 MG
25 CAPSULE ORAL EVERY 6 HOURS PRN
Status: DISCONTINUED | OUTPATIENT
Start: 2017-02-20 | End: 2017-02-23 | Stop reason: HOSPADM

## 2017-02-20 RX ADMIN — Medication 2 G: at 02:02

## 2017-02-20 RX ADMIN — HYDROMORPHONE HYDROCHLORIDE 0.5 MG: 2 INJECTION INTRAMUSCULAR; INTRAVENOUS; SUBCUTANEOUS at 04:02

## 2017-02-20 RX ADMIN — LIDOCAINE HYDROCHLORIDE 1 MG: 10 INJECTION, SOLUTION EPIDURAL; INFILTRATION; INTRACAUDAL; PERINEURAL at 11:02

## 2017-02-20 RX ADMIN — GLYCOPYRROLATE 0.6 MG: 0.2 INJECTION, SOLUTION INTRAMUSCULAR; INTRAVENOUS at 03:02

## 2017-02-20 RX ADMIN — SODIUM CHLORIDE: 0.9 INJECTION, SOLUTION INTRAVENOUS at 11:02

## 2017-02-20 RX ADMIN — FENTANYL CITRATE 25 MCG: 50 INJECTION, SOLUTION INTRAMUSCULAR; INTRAVENOUS at 03:02

## 2017-02-20 RX ADMIN — ALBUMIN (HUMAN): 12.5 SOLUTION INTRAVENOUS at 02:02

## 2017-02-20 RX ADMIN — ACETAMINOPHEN 1000 MG: 10 INJECTION, SOLUTION INTRAVENOUS at 09:02

## 2017-02-20 RX ADMIN — FENTANYL CITRATE 50 MCG: 50 INJECTION, SOLUTION INTRAMUSCULAR; INTRAVENOUS at 02:02

## 2017-02-20 RX ADMIN — PROPOFOL 70 MG: 10 INJECTION, EMULSION INTRAVENOUS at 03:02

## 2017-02-20 RX ADMIN — HYDROMORPHONE HYDROCHLORIDE 0.5 MG: 1 INJECTION, SOLUTION INTRAMUSCULAR; INTRAVENOUS; SUBCUTANEOUS at 11:02

## 2017-02-20 RX ADMIN — NEOSTIGMINE METHYLSULFATE 5 MG: 1 INJECTION INTRAVENOUS at 03:02

## 2017-02-20 RX ADMIN — HYDROMORPHONE HYDROCHLORIDE 1 MG: 2 INJECTION INTRAMUSCULAR; INTRAVENOUS; SUBCUTANEOUS at 04:02

## 2017-02-20 RX ADMIN — DEXAMETHASONE SODIUM PHOSPHATE 8 MG: 4 INJECTION, SOLUTION INTRAMUSCULAR; INTRAVENOUS at 02:02

## 2017-02-20 RX ADMIN — ONDANSETRON 4 MG: 2 INJECTION INTRAMUSCULAR; INTRAVENOUS at 03:02

## 2017-02-20 RX ADMIN — SODIUM CHLORIDE: 0.9 INJECTION, SOLUTION INTRAVENOUS at 04:02

## 2017-02-20 RX ADMIN — MIDAZOLAM HYDROCHLORIDE 2 MG: 1 INJECTION, SOLUTION INTRAMUSCULAR; INTRAVENOUS at 02:02

## 2017-02-20 RX ADMIN — ROCURONIUM BROMIDE 30 MG: 10 INJECTION, SOLUTION INTRAVENOUS at 02:02

## 2017-02-20 RX ADMIN — LIDOCAINE HYDROCHLORIDE 100 MG: 20 INJECTION, SOLUTION INTRAVENOUS at 02:02

## 2017-02-20 RX ADMIN — HYDROMORPHONE HYDROCHLORIDE 0.2 MG: 1 INJECTION, SOLUTION INTRAMUSCULAR; INTRAVENOUS; SUBCUTANEOUS at 04:02

## 2017-02-20 RX ADMIN — ROCURONIUM BROMIDE 40 MG: 10 INJECTION, SOLUTION INTRAVENOUS at 02:02

## 2017-02-20 RX ADMIN — INSULIN ASPART 1 UNITS: 100 INJECTION, SOLUTION INTRAVENOUS; SUBCUTANEOUS at 10:02

## 2017-02-20 RX ADMIN — FENTANYL CITRATE 50 MCG: 50 INJECTION, SOLUTION INTRAMUSCULAR; INTRAVENOUS at 03:02

## 2017-02-20 RX ADMIN — SODIUM CHLORIDE, SODIUM GLUCONATE, SODIUM ACETATE, POTASSIUM CHLORIDE, MAGNESIUM CHLORIDE, SODIUM PHOSPHATE, DIBASIC, AND POTASSIUM PHOSPHATE 250 ML: .53; .5; .37; .037; .03; .012; .00082 INJECTION, SOLUTION INTRAVENOUS at 05:02

## 2017-02-20 RX ADMIN — IBUPROFEN 800 MG: 800 INJECTION INTRAVENOUS at 05:02

## 2017-02-20 RX ADMIN — ACETAMINOPHEN 1000 MG: 10 INJECTION, SOLUTION INTRAVENOUS at 02:02

## 2017-02-20 RX ADMIN — SODIUM CHLORIDE, SODIUM GLUCONATE, SODIUM ACETATE, POTASSIUM CHLORIDE, MAGNESIUM CHLORIDE, SODIUM PHOSPHATE, DIBASIC, AND POTASSIUM PHOSPHATE: .53; .5; .37; .037; .03; .012; .00082 INJECTION, SOLUTION INTRAVENOUS at 02:02

## 2017-02-20 RX ADMIN — PROPOFOL 200 MG: 10 INJECTION, EMULSION INTRAVENOUS at 02:02

## 2017-02-20 NOTE — IP AVS SNAPSHOT
[facility]  [address]  [facility] [address]  Phone: [phone]           Patient Discharge Instructions     Our goal is to set you up for success. This packet includes information on your condition, medications, and your home care. It will help you to care for yourself so you don't get sicker and need to go back to the hospital.     Please ask your nurse if you have any questions.        There are many details to remember when preparing to leave the hospital. Here is what you will need to do:    1. Take your medicine. If you are prescribed medications, review your Medication List in the following pages. You may have new medications to  at the pharmacy and others that you'll need to stop taking. Review the instructions for how and when to take your medications. Talk with your doctor or nurses if you are unsure of what to do.     2. Go to your follow-up appointments. Specific follow-up information is listed in the following pages. Your may be contacted by a transition nurse or clinical provider about future appointments. Be sure we have all of the phone numbers to reach you, if needed. Please contact your provider's office if you are unable to make an appointment.     3. Watch for warning signs. Your doctor or nurse will give you detailed warning signs to watch for and when to call for assistance. These instructions may also include educational information about your condition. If you experience any of warning signs to your health, call your doctor.               [facility]  Unless otherwise directed by your provider, please contact [facility], our nurse care line that is available for 24/7 assistance.     [phone] (toll-free)    Registered nurses in the [facility] provide clinical advisement, health education, appointment booking, and other advisory services.                    ** Verify the list of medication(s) below is accurate and up  to date. Carry this with you in case of emergency. If your medications have changed, please notify your healthcare provider.             Medication List      START taking these medications        Additional Info    Begin Date AM Noon PM Bedtime    docusate sodium 100 MG capsule   Commonly known as:  COLACE   Refills:  0   Dose:  100 mg    Instructions:  Take 1 capsule (100 mg total) by mouth 2 (two) times daily.     You can buy over the counter                          enoxaparin 40 mg/0.4 mL Syrg   Commonly known as:  LOVENOX   Quantity:  10 mL   Refills:  0   Dose:  40 mg    Last time this was given:  40 mg on 2/22/2017  1:00 PM   Instructions:  Inject 0.4 mLs (40 mg total) into the skin every 24 hours as needed.     Next dose due tomorrow, 2/24/17                       oxycodone-acetaminophen 5-325 mg per tablet   Commonly known as:  PERCOCET   Quantity:  35 tablet   Refills:  0   Dose:  1 tablet    Instructions:  Take 1 tablet by mouth every 4 (four) hours as needed.                              CONTINUE taking these medications        Additional Info    Begin Date AM Noon PM Bedtime    BENICAR 5 MG Tab   Refills:  0   Generic drug:  olmesartan    Last time this was given:  5 mg on 2/22/2017  6:28 PM     Next dose due tomorrow, 2/24/17                       diphenhydrAMINE 25 mg tablet   Commonly known as:  SOMINEX   Refills:  0   Dose:  25 mg    Instructions:  Take 25 mg by mouth nightly as needed for Insomnia.                            GLUCOPHAGE 500 MG tablet   Refills:  0   Generic drug:  metformin    Instructions:  2 (two) times daily with meals.                            glutamine 500 mg Cap   Refills:  0   Dose:  1000 mg    Instructions:  Take 1,000 mg by mouth 2 (two) times daily.     Restart home routine                          hydrochlorothiazide 12.5 mg capsule   Commonly known as:  MICROZIDE   Refills:  0      Next dose due tomorrow, 2/24/17                       ondansetron 8 MG Tbdl   Commonly  known as:  ZOFRAN-ODT   Quantity:  12 tablet   Refills:  1   Dose:  8 mg    Last time this was given:  8 mg on 2/23/2017  6:01 AM   Instructions:  Take 1 tablet (8 mg total) by mouth every 12 (twelve) hours as needed (nausea and vomiting.).                            ONE DAILY MULTIVITAMIN per tablet   Refills:  0   Dose:  1 tablet   Generic drug:  multivitamin    Instructions:  Take 1 tablet by mouth once daily.     Restart home routine                          pregabalin 100 MG capsule   Commonly known as:  LYRICA   Quantity:  60 capsule   Refills:  2   Dose:  100 mg    Instructions:  Take 1 capsule (100 mg total) by mouth 2 (two) times daily.     Restart home routine                               Where to Get Your Medications      These medications were sent to [facility] - [place], [place] - [address], [place] [address]     Phone:  [phone]     enoxaparin 40 mg/0.4 mL Syrg         You can get these medications from any pharmacy     Bring a paper prescription for each of these medications     oxycodone-acetaminophen 5-325 mg per tablet       You don't need a prescription for these medications     docusate sodium 100 MG capsule                  Please bring to all follow up appointments:    1. A copy of your discharge instructions.  2. All medicines you are currently taking in their original bottles.  3. Identification and insurance card.    Please arrive 15 minutes ahead of scheduled appointment time.    Please call 24 hours in advance if you must reschedule your appointment and/or time.        Your Scheduled Appointments     Mar 23, 2017 11:15 AM CDT   Post OP with Rey Hernandez MD   [facility] - [facility]YN Oncology ([address] [address] [phone]              Follow-up Information     Follow up with Rey Hernandez MD. Schedule an appointment as soon as possible for a visit in 4 weeks.    Specialty:   "Gynecologic Oncology    Why:  post op check    Contact information:    [address]  [facility] [address]  [phone]          Discharge Instructions     Future Orders    Activity as tolerated     Call MD for:  difficulty breathing or increased cough     Call MD for:  increased confusion or weakness     Call MD for:  persistent dizziness, light-headedness, or visual disturbances     Call MD for:  persistent nausea and vomiting or diarrhea     Call MD for:  redness, tenderness, or signs of infection (pain, swelling, redness, odor or green/yellow discharge around incision site)     Call MD for:  severe persistent headache     Call MD for:  severe uncontrolled pain     Call MD for:  temperature >100.4     Call MD for:  worsening rash     Diet general     Questions:    Total calories:      Fat restriction, if any:      Protein restriction, if any:      Na restriction, if any:      Fluid restriction:      Additional restrictions:      Lifting restrictions     Scheduling Instructions:    Lift nothing heavier than ten pounds for 6 weeks    No dressing needed     Comments:    Please keep incision clean and dry. Wash daily with soap and water. Allow to air dry completely.        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      Admission Information     Date & Time Provider Department CSN    2/20/2017  9:51 AM Rey Hernandez MD [facility] [id number]      Care Providers     Provider Role Specialty Primary office phone    Rey Hernandez MD Attending Provider Gynecologic Oncology [phone]    Rey Hernandez MD Surgeon  Gynecologic Oncology [phone]      Your Vitals Were     BP Pulse Temp Resp Height Weight    137/63 (BP Location: Right arm, Patient Position: Sitting, BP Method: Automatic) 86 98.4 °F (36.9 °C) (Oral) 19 5' 8" (1.727 m) 103 kg (227 lb)    Last Period SpO2 BMI          06/20/2014 96% 34.52 kg/m2        Recent Lab Values     No lab values to " display.      Pending Labs     Order Current Status    Hemoglobin A1c if not done in past 6 weeks In process    Specimen to Pathology - Surgery In process    Prepare RBC 2 Units; surgery Preliminary result      Allergies as of 2/23/2017        Reactions    No Known Allergies       Advance Directives     An advance directive is a document which, in the event you are no longer able to make decisions for yourself, tells your healthcare team what kind of treatment you do or do not want to receive, or who you would like to make those decisions for you.  If you do not currently have an advance directive, [facility] encourages you to create one.  For more information call:  [phone]), [phone] ([phone]),  or log on to [email or web address].        Smoking Cessation     If you would like to quit smoking:   You may be eligible for free services if you are a [place] resident and started smoking cigarettes before September 1, 1988.  Call the Smoking Cessation Trust ([facility]) toll free at (831) 955-5041 or (520) 643-3115.   Call 5-611-QUIT-NOW if you do not meet the above criteria.            Language Assistance Services     ATTENTION: Language assistance services are available, free of charge. Please call [phone].      ATENCIÓN: Si habla español, tiene a michelle disposición servicios gratuitos de asistencia lingüística. Llame al [phone].     CHÚ Ý: N?u b?n nói Ti?ng Vi?t, có các d?ch v? h? tr? ngôn ng? mi?n phí dành cho b?n. G?i s? [phone].        Diabetes Discharge Instructions                                   [facility] Sign-Up     Activating your [facility] account is as easy as 1-2-3!     1) Visit [email or web address], select Sign Up Now, enter this activation code and your date of birth, then select Next.  [email or web address]  Expires: 2/25/2017 10:06 AM      2) Create a username and password to use when you visit [facility] in the future and select a security question in case you lose  your password and select Next.    3) Enter your e-mail address and click Sign Up!    Additional Information  If you have questions, please e-mail [email or web address] or call [phone] to talk to our [facility] staff. Remember, [facility] is NOT to be used for urgent needs. For medical emergencies, dial 911.          [facility] complies with applicable Federal civil rights laws and does not discriminate on the basis of race, color, national origin, age, disability, or sex.

## 2017-02-20 NOTE — PROGRESS NOTES
Dr. Ruiz at bedside to evaluate pt and answer questions, explained procedure and incision.  MD states pt pubic area does need to be clipped below where incision will be.

## 2017-02-20 NOTE — TRANSFER OF CARE
"Anesthesia Transfer of Care Note    Patient: Beulah Guillaume    Procedure(s) Performed: Procedure(s) (LRB):  LAPAROTOMY-EXPLORATORY  Omental biopsy (N/A)    Patient location: PACU    Anesthesia Type: general    Transport from OR: Transported from OR on room air with adequate spontaneous ventilation    Post pain: adequate analgesia    Post assessment: no apparent anesthetic complications    Post vital signs: stable    Level of consciousness: awake and alert    Nausea/Vomiting: no nausea/vomiting    Complications: none          Last vitals:   Visit Vitals   02/20/17 1616    /62    Pulse 99    Temp 36.7 °C (98.1 °F) (Oral)    Resp 18    Ht 5' 8" (1.727 m)    Wt 103 kg (227 lb)    LMP 06/20/2014    SpO2 99%    Breastfeeding No    BMI 34.52 kg/m2     "

## 2017-02-20 NOTE — ANESTHESIA PREPROCEDURE EVALUATION
02/20/2017  Beulah Guillaume is a 61 y.o., female.    Below from Dr. Ruiz's note  Patient comes in today to sign consents for interval debulking with DIANNA/BSO for primary peritoneal cancer.  has declined from 259 to 75 after 3 cycles of neoadjuvant taxol and carboplatin.   She has recurring ascites. Recent paracentesis was done with 5 liters of ascitic fluid removed. Cytology was positive for malignant cells.  Recent CT scan of the chest, abdomen and pelvis showed a somewhat nodular change in the right middle lobe measuring about 5.6 cm in maximum diameter. The amount of omental caking and thickening in the periumbilical area has decreased although there is still a little thickening of the abdominal wall just to the right of the umbilicus.  Uterus is enlarged with multiple calcified fibroids.  No new masses are identified.  Seen by pulmonary. Bronchoscopy was negative with negative cytology. Thought to be infectious.         Treatment history:  Taken to OR by Dr. Cole on 11/17/16 for lap cecille and found to have carcinomatosis. CT scan of abd and pelvis was consistent with carcinomatosis.  was 259 and CA 19-9 was 294. She also has been having PMB.        Pre-operative evaluation for Procedure(s) (LRB):  TUMOR DEBULKING (N/A)    Review of patient's allergies indicates:   Allergen Reactions    No known allergies        No current facility-administered medications on file prior to encounter.      Current Outpatient Prescriptions on File Prior to Encounter   Medication Sig Dispense Refill    diphenhydrAMINE (SOMINEX) 25 mg tablet Take 25 mg by mouth nightly as needed for Insomnia.      hydrochlorothiazide (MICROZIDE) 12.5 mg capsule       metformin (GLUCOPHAGE) 500 MG tablet 2 (two) times daily with meals.       olmesartan (BENICAR) 5 MG Tab       ondansetron (ZOFRAN-ODT) 8 MG TbDL Take 1  tablet (8 mg total) by mouth every 12 (twelve) hours as needed (nausea and vomiting.). 12 tablet 1    pregabalin (LYRICA) 100 MG capsule Take 1 capsule (100 mg total) by mouth 2 (two) times daily. 60 capsule 2       Patient Active Problem List   Diagnosis    Calculus of bile duct with cholecystitis without obstruction    Adenocarcinoma carcinomatosis    Type 2 diabetes mellitus without complication    Arthritis    Post-menopausal bleeding    Chemotherapy induced nausea and vomiting    Chemotherapy induced nausea and vomiting    Encounter for antineoplastic chemotherapy    Mass of middle lobe of right lung    Type 2 diabetes mellitus with diabetic mononeuropathy, without long-term current use of insulin    Pulmonary infiltrate       Past Surgical History   Procedure Laterality Date    Colposcopy       section       x 2    Dilation and curettage of uterus       for Polyps           No results for input(s): HCT in the last 72 hours.  No results for input(s): PLT in the last 72 hours.  No results for input(s): K in the last 72 hours.  No results for input(s): CREATININE in the last 72 hours.  No results for input(s): GLU in the last 72 hours.  Invalid input(s): PT                    [facility] Anesthesia Evaluation         Review of Systems  Anesthesia Hx:  No problems with previous Anesthesia Present Prior to Hospital Arrival?: No; Placement Date: 16; Placement Time: 1313; Method of Intubation: Hansen; Inserted by: CRNA; Airway Device: Endotracheal Tube; Mask Ventilation: Easy - oral; Intubated: Postinduction; Blade: Pavel #2; Airway Device Size: 7.0; Style: Cuffed; Cuff Inflation: Minimal occlusive pressure; Inflation Amount: 5; Placement Verified By: Auscultation, Capnometry; Grade: Grade I; Complicating Factors: Overbite, Obesity, Short neck, Poor neck/head extension; Intubation Findings: Positive EtCO2, Bilateral breath sounds, Atraumatic/Condition of teeth unchanged;  Depth of Insertion:  21; Securment: Lips; Complications: None; Breath Sounds: Equal Bilateral; Insertion Attempts: 1; Removal Date: 11/17/16;  Removal Time: 1419   Social:  Non-Smoker, Alcohol Use    Hematology/Oncology:  Hematology Normal      Current/Recent Cancer.   Cardiovascular:   Denies Hypertension.  Denies MI.    Denies Angina. Walks 2 miles frequently, 20 minutes on exercycle   Pulmonary:   Denies COPD.  Denies Asthma.  Denies Shortness of breath.    Renal/:   Denies Chronic Renal Disease.     Hepatic/GI:   Denies Liver Disease.    Neurological:   Denies TIA. Denies CVA. Denies Seizures.    Endocrine:   Diabetes, type 2        Physical Exam  General:  Well nourished    Airway/Jaw/Neck:  Airway Findings: Mouth Opening: Normal Tongue: Normal  General Airway Assessment: Adult, Average  Mallampati: II  TM Distance: Normal, at least 6 cm  Jaw/Neck Findings:  Neck ROM: Normal ROM       Chest/Lungs:  Chest/Lungs Findings: Clear to auscultation     Heart/Vascular:  Heart Findings: Rate: Normal  Rhythm: Regular Rhythm  Sounds: Normal        Mental Status:  Mental Status Findings:  Cooperative, Alert and Oriented         Anesthesia Plan  Type of Anesthesia, risks & benefits discussed:  Anesthesia Type:  general  Patient's Preference:   Intra-op Monitoring Plan:   Intra-op Monitoring Plan Comments:   Post Op Pain Control Plan:   Post Op Pain Control Plan Comments: As per surgeon's plan  Induction:   IV  Beta Blocker:  Patient is not currently on a Beta-Blocker (No further documentation required).       Informed Consent: Patient understands risks and agrees with Anesthesia plan.  Questions answered. Anesthesia consent signed with patient.  ASA Score: 2     Day of Surgery Review of History & Physical:    H&P update referred to the surgeon.         Ready For Surgery From Anesthesia Perspective.

## 2017-02-20 NOTE — BRIEF OP NOTE
[facility]  Brief Operative Note    SUMMARY     Surgery Date: 2/20/2017     Surgeon(s) and Role:     * Rey Hernandez MD - Primary     * Re Penn MD - Resident - Assisting        Pre-op Diagnosis:  Adenocarcinoma carcinomatosis [C80.0]    Post-op Diagnosis:  Post-Op Diagnosis Codes:     * Adenocarcinoma carcinomatosis [C80.0]    Procedure(s) (LRB):  LAPAROTOMY-EXPLORATORY  Omental biopsy (N/A)    Anesthesia: General    Description of Procedure: exploration. Omental biopsy     Description of the findings of the procedure: uterus fixed in pelvis. Stomach wall thicken. Spleen fixed to lateral stomach. Small bowel mesentery shortened and thicken. Thickened, firm omentum fixed to anterior abdominal wall and transverse colon and greater curvature of the stomach.     Estimated Blood Loss: 250 mL  Total Fluids:2000 ml  Albumin: 500 ml   Urine Output: 150 ml   Ascites 3500 ml          Specimens:   Specimen (12h ago through future)    Start     Ordered    02/20/17 1526  Specimen to Pathology - Surgery  Once     Comments:  Specimen to pathology:  1. Omentum. Fresh specimen; Biobank.    02/20/17 1527

## 2017-02-20 NOTE — INTERVAL H&P NOTE
The patient has been examined and the H&P has been reviewed:    I concur with the findings and no changes have occurred since H&P was written.  Surgery risks, benefits and alternative options discussed and understood by patient/family.  Pt agrees to proceed.      Active Hospital Problems    Diagnosis  POA    Adenocarcinoma carcinomatosis [C80.0]  Yes      Resolved Hospital Problems    Diagnosis Date Resolved POA   No resolved problems to display.

## 2017-02-21 LAB
BASOPHILS # BLD AUTO: 0.01 K/UL
BASOPHILS NFR BLD: 0.1 %
DIFFERENTIAL METHOD: ABNORMAL
EOSINOPHIL # BLD AUTO: 0 K/UL
EOSINOPHIL NFR BLD: 0.5 %
ERYTHROCYTE [DISTWIDTH] IN BLOOD BY AUTOMATED COUNT: 18.9 %
HCT VFR BLD AUTO: 28.9 %
HGB BLD-MCNC: 9.6 G/DL
LYMPHOCYTES # BLD AUTO: 1.1 K/UL
LYMPHOCYTES NFR BLD: 14.7 %
MCH RBC QN AUTO: 27.4 PG
MCHC RBC AUTO-ENTMCNC: 33.2 %
MCV RBC AUTO: 82 FL
MONOCYTES # BLD AUTO: 0.9 K/UL
MONOCYTES NFR BLD: 11.3 %
NEUTROPHILS # BLD AUTO: 5.6 K/UL
NEUTROPHILS NFR BLD: 73.3 %
PLATELET # BLD AUTO: 201 K/UL
PMV BLD AUTO: 8.2 FL
POCT GLUCOSE: 125 MG/DL (ref 70–110)
POCT GLUCOSE: 137 MG/DL (ref 70–110)
RBC # BLD AUTO: 3.51 M/UL
WBC # BLD AUTO: 7.7 K/UL

## 2017-02-21 PROCEDURE — 85025 COMPLETE CBC W/AUTO DIFF WBC: CPT

## 2017-02-21 PROCEDURE — 63600175 PHARM REV CODE 636 W HCPCS: Performed by: OBSTETRICS & GYNECOLOGY

## 2017-02-21 PROCEDURE — 36415 COLL VENOUS BLD VENIPUNCTURE: CPT

## 2017-02-21 PROCEDURE — 20600001 HC STEP DOWN PRIVATE ROOM

## 2017-02-21 PROCEDURE — 25000003 PHARM REV CODE 250: Performed by: OBSTETRICS & GYNECOLOGY

## 2017-02-21 RX ORDER — ENOXAPARIN SODIUM 100 MG/ML
40 INJECTION SUBCUTANEOUS
Qty: 10 ML | Refills: 0 | Status: SHIPPED | OUTPATIENT
Start: 2017-02-21 | End: 2017-03-14

## 2017-02-21 RX ORDER — OXYCODONE AND ACETAMINOPHEN 10; 325 MG/1; MG/1
1 TABLET ORAL EVERY 4 HOURS PRN
Status: DISCONTINUED | OUTPATIENT
Start: 2017-02-21 | End: 2017-02-23 | Stop reason: HOSPADM

## 2017-02-21 RX ORDER — OXYCODONE AND ACETAMINOPHEN 5; 325 MG/1; MG/1
1 TABLET ORAL EVERY 4 HOURS PRN
Status: DISCONTINUED | OUTPATIENT
Start: 2017-02-22 | End: 2017-02-23 | Stop reason: HOSPADM

## 2017-02-21 RX ORDER — ENOXAPARIN SODIUM 100 MG/ML
40 INJECTION SUBCUTANEOUS EVERY 24 HOURS
Status: DISCONTINUED | OUTPATIENT
Start: 2017-02-21 | End: 2017-02-23 | Stop reason: HOSPADM

## 2017-02-21 RX ADMIN — HYDROMORPHONE HYDROCHLORIDE 0.5 MG: 1 INJECTION, SOLUTION INTRAMUSCULAR; INTRAVENOUS; SUBCUTANEOUS at 09:02

## 2017-02-21 RX ADMIN — ACETAMINOPHEN 1000 MG: 10 INJECTION, SOLUTION INTRAVENOUS at 05:02

## 2017-02-21 RX ADMIN — IBUPROFEN 800 MG: 800 INJECTION INTRAVENOUS at 02:02

## 2017-02-21 RX ADMIN — IBUPROFEN 800 MG: 800 INJECTION INTRAVENOUS at 05:02

## 2017-02-21 RX ADMIN — ACETAMINOPHEN 1000 MG: 10 INJECTION, SOLUTION INTRAVENOUS at 02:02

## 2017-02-21 RX ADMIN — HYDROMORPHONE HYDROCHLORIDE 0.5 MG: 1 INJECTION, SOLUTION INTRAMUSCULAR; INTRAVENOUS; SUBCUTANEOUS at 10:02

## 2017-02-21 RX ADMIN — IBUPROFEN 800 MG: 800 INJECTION INTRAVENOUS at 10:02

## 2017-02-21 RX ADMIN — HYDROMORPHONE HYDROCHLORIDE 0.5 MG: 1 INJECTION, SOLUTION INTRAMUSCULAR; INTRAVENOUS; SUBCUTANEOUS at 05:02

## 2017-02-21 RX ADMIN — SODIUM CHLORIDE: 0.9 INJECTION, SOLUTION INTRAVENOUS at 05:02

## 2017-02-21 RX ADMIN — IBUPROFEN 800 MG: 800 INJECTION INTRAVENOUS at 12:02

## 2017-02-21 NOTE — PROGRESS NOTES
Pt resting quietly,VSS per monitor, abd dressing dry and intact, pt states pain 2/10 and tolerable, report called to Homa SANDERSON, pt made ready fro transport to [facility] stretcher per PCT,  informed of room number, pt stable at present, sent with hospital belongings bag x1.

## 2017-02-21 NOTE — OP NOTE
DATE OF PROCEDURE:  02/20/2017    PREOPERATIVE DIAGNOSES:  FIGO stage III-C primary peritoneal carcinoma, status   post three cycles of neoadjuvant Taxol and carboplatin.    POSTOPERATIVE DIAGNOSIS:  Unresectable primary peritoneal carcinoma.    SURGEON:  Rey Hernandez M.D.    FIRST ASSISTANT:  Re Penn M.D. (RES)    ANESTHESIA:  GETA.    ESTIMATED BLOOD LOSS:  200 mL.    IV FLUIDS:  2400 mL.    ALBUMIN:  500 mL.    URINE OUTPUT:  150 mL.    ASCITES:  3500 mL.    OPERATIVE HISTORY:  This is a 61-year-old patient who had been taken to the   Operating Room in November 2016 for laparoscopic cholecystectomy by Dr. Jairo Cole.  She was found to have carcinomatosis.  A CT scan of the abdomen and   pelvis was consistent with this.  CA-125 was elevated. Biopsy showed adenocarcinoma and consistent with primary peritoneal carcinoma.     The patient has completed three cycles of Taxol and carboplatin.  Her CA-125 has   declined to 75.  CT scan of the abdomen and pelvis suggested improvement in   peritoneal disease.  However, there was a right middle lobe lung mass and she was   referred to Pulmonary Medicine.  They did a bronchoscopy with negative cytology.    They felt that this was inflammatory.    The patient was brought to the Operating Room for an interval tumor debulking.    OPERATIVE FINDINGS:    I made a relatively small incision because I was concerned about the   extent of ascites,   so I tried to minimize the incision to lessen the patient's postoperative   Risk of leaking ascitic fluid thru the incision. Upon opening the abdominal cavity, there was 3500 mL of   bloody ascitic fluid.  The was omentum adherent to the anterior abdominal   wall.  The omentum is very firm and fibrous.  It is fixed to the transverse   colon as well as to the greater curvature of the stomach.  The spleen is fixed   to the greater curvature of the stomach.  The stomach wall itself was thickened. The liver felt smooth.        The pelvis is fixed.  The uterus is fixed.  There is thickening of the sigmoid   colon.  The small bowel is tethered down and very firm and fibrotic in the   mesentery.  The tumor was unresectable.  I resected a portion of the omentum for   permanent section.  I did not take down all the omentum as I did not want to   have a bowel injury as there were loops of small bowel that were along this   portion of the omentum adherent to the anterior abdominal wall.  I cleared   enough fascia so that we could close the fascia.    OPERATIVE PROCEDURE:  The patient was brought to the Operating Room and after   induction of general anesthesia, was placed in [facility] stirrups.  The vulva and   vagina were prepped with Betadine scrub and solution.  Hughes catheter was   placed.    The abdomen was then prepped with ChloraPrep and the patient was sterilely   draped.    Midline abdominal incision was made from above the patient's panniculus to the   umbilicus.  This was carried down to the fascia.  Given the patient had two   prior midline incisions, I extended the incision up above the umbilicus.  At   this point, we could see what appeared to be ascitic fluid and the peritoneum.    The peritoneum was grasped with hemostats and incised and we then drained 3500   mL of bloody ascitic fluid.    At this point, I extended the incision more superiorly.  I then began to incise   the fascia inferiorly and identified omentum that was adherent to the anterior   abdominal wall.    At this point, given the firmness of the omentum, I was concerned about   resectability and I made the incision large enough so that I could insert my   hand.  Exploration of the pelvis at this time reveals that the uterus is   enlarged.  It is firm and fibrotic.  There are no palpable adnexal masses.  The   sigmoid colon and its mesentery is firm and fixed as well.  The uterus is   nonmobile.    I then explored the upper abdomen.  The stomach itself was  thickened.  The   spleen is pulled towards the greater curvature of the stomach with very little   free space between the greater curvature of the stomach and the spleen.  The   spleen itself is also very firm and fibrotic.    Exploration of the right upper quadrant is somewhat limited by this incision,   but the liver edge was smooth and there was no definitive mass.    The omentum is completely firm and fibrotic.  It is fixed to the transverse   colon as well as to the greater curvature of the stomach.    At this point, I feel that this is unresectable and that any attempt at further   exploration could cause more harm.    I now dissect the omentum off of the peritoneum and fascia so that we can have   adequate fascia to close.  An omental biopsy was taken.    I infiltrated the preperitoneal and perifascial tissues with 266 mg of   Exparel.  This was 20 mL diluted in 100 mL of injectable normal saline.  A 60 mL   was injected on each side.    The fascia and peritoneum were now closed in a bulk closure with a #1 looped PDS   suture.  The sutures were placed close together to minimize leakage of ascitic   fluid.    The subcutaneous fat is then irrigated and closed with a running 2-0 plain   catgut.  The skin is then closed with a running 4-0 Monocryl suture beginning   superiorly and inferiorly and tying approximately mid incision.    At this point, the patient was awakened and taken to the Recovery Room in stable   condition.  Lap, needle, sponge and instrument count was correct.      TYRA  dd: 02/20/2017 16:08:12 (CST)  td: 02/20/2017 21:46:57 (CST)  Doc ID   #[id number]  Job ID #[id number]    CC:

## 2017-02-21 NOTE — PLAN OF CARE
Problem: Patient Care Overview  Goal: Plan of Care Review  Outcome: Ongoing (interventions implemented as appropriate)  Pt remained free of falls and injury. Bed in low locked position side rails up x2 with call light and belongings in reach. Non skid socks, teds and scds in place. IS at bedside. IVFs infusing. Tylenol and Ibuprofen given as scheduled. All VS stable. No acute events thus far. Will continue to monitor.

## 2017-02-21 NOTE — PROGRESS NOTES
Admit Note: Pt arrived to floor via stretcher from PACU. All VS stable. IVFs infusing    Skin Note: midline telfa dressing in place with dried drainage    Fall Note: Bed in low locked position side rails up x2 with call light and belongings in reach. Non skids socks, teds and SCDs in place

## 2017-02-21 NOTE — ANESTHESIA POSTPROCEDURE EVALUATION
"Anesthesia Post Evaluation    Patient: Beulah Guillaume    Procedure(s) Performed: Procedure(s) (LRB):  LAPAROTOMY-EXPLORATORY  Omental biopsy (N/A)    Final Anesthesia Type: general  Patient location during evaluation: PACU  Patient participation: Yes- Able to Participate  Level of consciousness: awake and alert and awake  Post-procedure vital signs: reviewed and stable  Pain management: adequate  Airway patency: patent  PONV status at discharge: No PONV  Anesthetic complications: no      Cardiovascular status: blood pressure returned to baseline  Respiratory status: unassisted and spontaneous ventilation  Hydration status: euvolemic  Follow-up not needed.        Visit Vitals    /60    Pulse 82    Temp 36.7 °C (98.1 °F) (Oral)    Resp 11    Ht 5' 8" (1.727 m)    Wt 103 kg (227 lb)    LMP 06/20/2014    SpO2 (!) 94%    Breastfeeding No    BMI 34.52 kg/m2       Pain/Artie Score: Pain Assessment Performed: Yes (2/20/2017  6:00 PM)  Presence of Pain: complains of pain/discomfort (2/20/2017  6:00 PM)  Pain Rating Prior to Med Admin: 8 (2/20/2017  4:40 PM)  Artie Score: 10 (2/20/2017  6:00 PM)      "

## 2017-02-21 NOTE — PLAN OF CARE
Problem: Patient Care Overview  Goal: Plan of Care Review  Outcome: Ongoing (interventions implemented as appropriate)  Pt remained free of falls and injury. Bed in low locked position side rails up x2 with call light and belongings in reach. Non skid socks, teds and scds in place. IS at bedside. IVF dc/d. Tylenol and Ibuprofen given as scheduled. PRN pain meds given as ordered. All VSS stable. No acute events thus far. Will continue to monitor.

## 2017-02-21 NOTE — PROGRESS NOTES
Progress Note                    Gynecology Oncology      Admit Date: 02/21/2017  1 Day Post-Op  Hospital Day: 2    SUBJECTIVE:    Beulah Guillaume is a 61 y.o. AAF who is now POD#1 status post ExLap secondary to primary peritoneal cancer. IntraOp disease was to extensive for planned debulking.     Did well overnight. Pain is controlled with IV acetaminophen/ibuprofen with IV dilaudid for breakthrough (one dose overnight). Tolerating liquids po without nausea or vomiting. Denies fever, chills, CP, SOB. Hughes in place draining clear urine (UOP 1250 cc overnight), yet to ambulate. Denies bowel movement or flatus.    OBJECTIVE:    Temp:  [98 °F (36.7 °C)-98.2 °F (36.8 °C)] 98.1 °F (36.7 °C)  Pulse:  [68-99] 68  Resp:  [10-20] 17  SpO2:  [93 %-100 %] 96 %  BP: (106-161)/(52-70) 114/59      Intake/Output Summary (Last 24 hours) at 02/21/17 0603  Last data filed at 02/21/17 0550   Gross per 24 hour   Intake             5250 ml   Output             5050 ml   Net              200 ml       Physical Exam:  Gen: A&O, NAD  Pulm: LCTAB  CV: RRR without clicks, rubs or murmurs  Abd: Normoactive BS, ND, soft, mild TTP without rebound or guarding  Incision: Bandage in place, clean and dry  Extr: PPP, no edema or cyanosis    Laboratory:  Recent Results (from the past 14 hour(s))   POCT glucose    Collection Time: 02/20/17  4:22 PM   Result Value Ref Range    POCT Glucose 96 70 - 110 mg/dL   POCT glucose    Collection Time: 02/20/17 10:48 PM   Result Value Ref Range    POCT Glucose 221 (H) 70 - 110 mg/dL   POCT glucose    Collection Time: 02/21/17  5:54 AM   Result Value Ref Range    POCT Glucose 125 (H) 70 - 110 mg/dL       Diagnostic Results:  None    ASSESSMENT/PLAN:    Active Hospital Problems    Diagnosis  POA    *Adenocarcinoma carcinomatosis [C80.0]  Yes    S/P exploratory laparotomy on 2/20 [Z98.890]  Not Applicable      Resolved Hospital Problems    Diagnosis Date Resolved POA   No resolved problems to display.        61 y.o. AAF with past medical history significant for DM2 now POD#1 status post ExLap secondary to primary peritoneal cancer. Disease too bulky for planned DIANNA/BSO/debulking.     1. Post-op. VSS. PE unremarkable.  - Continue routine advances  - Encourage ambulation   - Continue IV acetaminophen/ibuprofen , then transition to oral meds   - Remove snyder  - Advance diet as tolerated   - Continue IS  - AM CBC pending    2. DM2  - Check blood glucose ACHS    - SSI  - Home metformin held     3. Neuropathy  - Home gabapentin held, resume on dischage    4. DVT PPx  - SCD/TEDs  - Lovnenox 40/daily after AM CBC results     Re Anna MD PGY-3   Ob-Gyn Resident

## 2017-02-21 NOTE — MEDICAL/APP STUDENT
"Progress Note  Gynecology    Admit Date: 2017  LOS: 1    Reason for Admission:  Adenocarcinoma carcinomatosis    SUBJECTIVE:     Beulah Guillaume is a 61 y.o.  who is POD1, for  Tumor debulking w/ DIANNA/BSO which was aborted secondary to extensive omental caking.     She is doing well postoperatively with pain well controlled on IV pain medications. Denies flatus, BM. Urinating with snyder in place. Denies N/V. Denies fevers/chills. Denies CP/SOB/dizziness/lightheadedness. Tolerating Liquids and "ice cream". Has not ambulated.        OBJECTIVE:     Vital Signs   Temp:  [98 °F (36.7 °C)-98.2 °F (36.8 °C)] 98.1 °F (36.7 °C)  Pulse:  [68-99] 68  Resp:  [10-20] 17  SpO2:  [93 %-100 %] 96 %  BP: (106-161)/(52-70) 114/59      Intake/Output Summary (Last 24 hours) at 17 0517  Last data filed at 17 0414   Gross per 24 hour   Intake             4900 ml   Output             4700 ml   Net              200 ml       Physical Exam:  Gen: A&Ox3, NAD  CV: RRR  Pulm: LCTAB  Abd: distended, active bowel sounds, soft, , non-tender to palpation without rebound or guarding  Ext: PPP, no peripheral edema, TEDs/SCDs in place    Laboratory:  Recent Results (from the past 24 hour(s))   Type & Screen    Collection Time: 17 10:25 AM   Result Value Ref Range    Group & Rh B POS     Indirect Sam NEG    Prepare RBC 2 Units; surgery    Collection Time: 17 10:26 AM   Result Value Ref Range    UNIT NUMBER [id number]     PRODUCT CODE S1209B99     DISPENSE STATUS CROSSMATCHED     CODING SYSTEM BZYJ438     Unit Blood Type Code 7300     Unit Blood Type B POS     Unit Expiration [id number]     UNIT NUMBER [id number]     PRODUCT CODE D4328X64     DISPENSE STATUS CROSSMATCHED     CODING SYSTEM MZIJ643     Unit Blood Type Code 7300     Unit Blood Type B POS     Unit Expiration [id number]    POCT glucose    Collection Time: 17 11:21 AM   Result Value Ref Range    POCT Glucose 92 70 - 110 mg/dL   POCT glucose "    Collection Time: 17  4:22 PM   Result Value Ref Range    POCT Glucose 96 70 - 110 mg/dL   POCT glucose    Collection Time: 17 10:48 PM   Result Value Ref Range    POCT Glucose 221 (H) 70 - 110 mg/dL       Diagnostic Results    *** CBC pending***    ASSESSMENT/PLAN:     Active Hospital Problems    Diagnosis  POA    *Adenocarcinoma carcinomatosis [C80.0]  Yes    S/P exploratory laparotomy on  [Z98.890]  Not Applicable      Resolved Hospital Problems    Diagnosis Date Resolved POA   No resolved problems to display.       Assessment: 61 y.o.  is progressing appropriately post-operatively.     Plan:   1. Remove Hughes and await ambulatory status  2. Monitor Diet and Pain status  3. Send home with PO pain medication  4. Plan for F/U

## 2017-02-22 ENCOUNTER — TELEPHONE (OUTPATIENT)
Dept: GYNECOLOGIC ONCOLOGY | Facility: CLINIC | Age: 62
End: 2017-02-22

## 2017-02-22 LAB
POCT GLUCOSE: 113 MG/DL (ref 70–110)
POCT GLUCOSE: 123 MG/DL (ref 70–110)
POCT GLUCOSE: 126 MG/DL (ref 70–110)
POCT GLUCOSE: 130 MG/DL (ref 70–110)
POCT GLUCOSE: 86 MG/DL (ref 70–110)

## 2017-02-22 PROCEDURE — 20600001 HC STEP DOWN PRIVATE ROOM

## 2017-02-22 PROCEDURE — 63600175 PHARM REV CODE 636 W HCPCS: Performed by: OBSTETRICS & GYNECOLOGY

## 2017-02-22 PROCEDURE — 25000003 PHARM REV CODE 250: Performed by: OBSTETRICS & GYNECOLOGY

## 2017-02-22 PROCEDURE — 25000003 PHARM REV CODE 250: Performed by: STUDENT IN AN ORGANIZED HEALTH CARE EDUCATION/TRAINING PROGRAM

## 2017-02-22 RX ORDER — HYDROCHLOROTHIAZIDE 12.5 MG/1
12.5 TABLET ORAL DAILY
Status: DISCONTINUED | OUTPATIENT
Start: 2017-02-23 | End: 2017-02-23 | Stop reason: HOSPADM

## 2017-02-22 RX ORDER — OLMESARTAN MEDOXOMIL 5 MG/1
5 TABLET ORAL DAILY
Status: DISCONTINUED | OUTPATIENT
Start: 2017-02-22 | End: 2017-02-23 | Stop reason: HOSPADM

## 2017-02-22 RX ORDER — IBUPROFEN 600 MG/1
600 TABLET ORAL EVERY 6 HOURS
Status: DISCONTINUED | OUTPATIENT
Start: 2017-02-22 | End: 2017-02-22

## 2017-02-22 RX ADMIN — SIMETHICONE CHEW TAB 80 MG 80 MG: 80 TABLET ORAL at 03:02

## 2017-02-22 RX ADMIN — SIMETHICONE CHEW TAB 80 MG 80 MG: 80 TABLET ORAL at 05:02

## 2017-02-22 RX ADMIN — OXYCODONE HYDROCHLORIDE AND ACETAMINOPHEN 1 TABLET: 10; 325 TABLET ORAL at 10:02

## 2017-02-22 RX ADMIN — SIMETHICONE CHEW TAB 80 MG 80 MG: 80 TABLET ORAL at 10:02

## 2017-02-22 RX ADMIN — OXYCODONE HYDROCHLORIDE AND ACETAMINOPHEN 1 TABLET: 10; 325 TABLET ORAL at 05:02

## 2017-02-22 RX ADMIN — OLMESARTAN MEDOXOMIL 5 MG: 5 TABLET, FILM COATED ORAL at 06:02

## 2017-02-22 RX ADMIN — DIPHENHYDRAMINE HYDROCHLORIDE 25 MG: 25 CAPSULE ORAL at 10:02

## 2017-02-22 RX ADMIN — IBUPROFEN 600 MG: 600 TABLET, FILM COATED ORAL at 05:02

## 2017-02-22 RX ADMIN — ENOXAPARIN SODIUM 40 MG: 100 INJECTION SUBCUTANEOUS at 01:02

## 2017-02-22 RX ADMIN — OXYCODONE HYDROCHLORIDE AND ACETAMINOPHEN 1 TABLET: 10; 325 TABLET ORAL at 06:02

## 2017-02-22 NOTE — TELEPHONE ENCOUNTER
Pt daughter Ms. Driver called to speak with Dr. Hernandez regarding pt care. She states pt had surgery and nothing was taken out, they were told chemo was successful and her levels had dropped. Daughter said they was informed cancer is now in two places and wants to discuss with Dr. Hernandez what is going on with her mother. She states pt wants to get a second opinion with [facility] and she lives 25 miles away from facility, but wants to speak with Dr. Hernandez first. Pt daughter states Dr. Hernandez can reach her at [phone] around 5:00 when she gets off from work. Ms. Driver advised Dr. Hernandez is not in clinic this afternoon, I will send physician a text message regarding the matter and will get contact her after speaking with Dr. Hernandez. She voiced understanding.    Dr. Hernandez responded. He informed me to confirm with pt it is okay for him to speak with pt daughter. Pt states it is fine for Dr. Hernandez to speak with her daughter. Per Dr. Hernandez, Ms. Driver is aware he will call her this afternoon. Ms. Driver says thank you

## 2017-02-22 NOTE — PHYSICIAN QUERY
PT Name: Beulah Guillaume  MR #: [id number]     Physician Query Form - Documentation Clarification    Reviewer  Ext  Lisa Serrano, KIN - [email or web address]    This form is a permanent document in the medical record.     Query Date: February 22, 2017  By submitting this query, we are merely seeking further clarification of documentation to reflect the severity of illness of your patient. Please utilize your independent clinical judgment when addressing the question(s) below.    (The Medical record reflects the following:)      Supporting Clinical Findings Location in Medical Record   The was omentum adherent to the anterior abdominal wall. The omentum is very firm and fibrous. It is fixed to the transverse colon as well as to the greater curvature of the stomach.     I resected a portion of the omentum for permanent section. I did not take down all the omentum as I did not want to have a bowel injury as there were loops of small bowel that were along this portion of the omentum adherent to the anterior abdominal wall.     I then began to incise the fascia inferiorly and identified omentum that was adherent to the anterior abdominal wall.    I now dissect the omentum off of the peritoneum and fascia so that we can have adequate fascia to close. An omental biopsy was taken.    Op note        Op note          Op note      Op note                                                                                      Doctor, Please specify diagnosis or diagnoses associated with above clinical findings.  Please further specify omental biopsy.  [   ] Greater omentum  [   ] Lesser omentum   [   ] Other (specify) __________  [ x  ] Clinically undetermined    Physician Use Only                                                                                                                               [  ] Unable to determine

## 2017-02-22 NOTE — TELEPHONE ENCOUNTER
----- Message from Christine Meadows sent at 2/22/2017 10:22 AM CST -----  Please contact Ms Shanique Driver/daughter to Ms Guillaume/Ms Driver can be reached at [phone]     RE: Beulah Guillaume     [facility]# [id number]

## 2017-02-22 NOTE — PROGRESS NOTES
Pts Lovenox prescription was filled at [facility] pharmacy.  The medication is ready for  with a $5.00 co-pay cost to the patient.  No other needs identified at this time.

## 2017-02-22 NOTE — PLAN OF CARE
Problem: Patient Care Overview  Goal: Plan of Care Review  Outcome: Ongoing (interventions implemented as appropriate)  Pt remained free of falls and injury. Bed in low locked position side rails up x 2 with call light and belongings in reach. Non skid socks, teds and scds in place. IS at bedside. Incision intact. Ibuprofen given as scheduled. Patient refusing teds/scds. Educated her of the importance of them. Patient ambulating independently to restroom. PRN pain meds given as ordered. All VSS stable. No acute events thus far. Will continue to monitor.

## 2017-02-22 NOTE — PLAN OF CARE
Problem: Patient Care Overview  Goal: Plan of Care Review  Outcome: Ongoing (interventions implemented as appropriate)  Pt remained free of falls and injury. Bed in low locked position side rails up x2 with call light and belongings in reach. Non skid socks, teds and scds in place. IS at bedside. PRN pain meds given as ordered.simethecone.  BP elevated on shift. Pt restarted on Benicar on shift. POC reviewed.

## 2017-02-22 NOTE — PROGRESS NOTES
Progress Note                    Gynecology Oncology      Admit Date: 02/22/2017  2 Days Post-Op  Hospital Day: 3    SUBJECTIVE:    Beulah Guillaume is a 61 y.o. AAF who is now POD#2 status post ExLap secondary to primary peritoneal cancer. IntraOp disease was too extensive for planned debulking.     No issues overnight. Pain is controlled with scheduled ibuprofen and IV dilaudid x3 over past 24 hrs. Tolerating regular diet without nausea or vomiting. Denies fever, chills, CP, SOB. Ambulating and urinating without difficulty. Denies bowel movement or flatus. Complains of gas cramping this AM.    OBJECTIVE:    Temp:  [98 °F (36.7 °C)-98.6 °F (37 °C)] 98 °F (36.7 °C)  Pulse:  [69-74] 70  Resp:  [18] 18  SpO2:  [95 %-99 %] 99 %  BP: (132-148)/(60-70) 140/70      Intake/Output Summary (Last 24 hours) at 02/22/17 0538  Last data filed at 02/22/17 0007   Gross per 24 hour   Intake             1950 ml   Output              800 ml   Net             1150 ml       Physical Exam:  Gen: A&O, NAD  Pulm: LCTAB  CV: RRR without clicks, rubs or murmurs  Abd: Normoactive BS, ND, soft, mild TTP without rebound or guarding  Incision: Bandage removed, steristrips in place, clean, dry, intact  Extr: PPP, no edema or cyanosis    Laboratory:  Blood Sugars (AccuCheck):  Recent Labs      02/20/17   1121  02/20/17   1622  02/20/17   2248  02/21/17   0554  02/21/17   1738  02/21/17   2048   POCTGLUCOSE  92  96  221*  125*  137*  130*       Recent Labs  Lab 02/21/17  0515   WBC 7.70   HGB 9.6*   HCT 28.9*   MCV 82           ASSESSMENT/PLAN:    Active Hospital Problems    Diagnosis  POA    *Adenocarcinoma carcinomatosis [C80.0]  Yes    S/P exploratory laparotomy on 2/20 [Z98.890]  Not Applicable      Resolved Hospital Problems    Diagnosis Date Resolved POA   No resolved problems to display.       61 y.o. AAF with past medical history significant for DM2 now POD#2 status post ExLap secondary to primary peritoneal cancer.  Disease too bulky for planned DIANNA/BSO/debulking.     1. Post-op. VSS. PE unremarkable.  - Continue routine advances  - Encourage ambulation   - Pain control with scheduled PO ibuprofen, percocet PRN. IV went bad this AM, refusing replacement. OK with not having IV dilaudid for BTP.   - /12hr  - Regular diet  - Continue IS  - H/H 9.3/29.5 >9.6/28.9  - Monitor for return of bowel function. Colace, simethicone prn    2. DM2  - Check blood glucose ACHS    - SSI  - Home metformin held     3. Neuropathy  - Home gabapentin held, resume on dischage    4. DVT PPx  - Patient refusing SCD/TEDs. Stressed importance of DVT prevention, patient still declines, states she will ambulate more and do leg exercises in bed.  - Lovnenox 40/daily     Dispo: discharge pending return of adequate bowel function, anticipate DC POD#3-5    Janessa Ramesh MD  OBGYN - PGY 2  [phone]

## 2017-02-23 ENCOUNTER — TELEPHONE (OUTPATIENT)
Dept: GYNECOLOGIC ONCOLOGY | Facility: CLINIC | Age: 62
End: 2017-02-23

## 2017-02-23 VITALS
BODY MASS INDEX: 34.4 KG/M2 | SYSTOLIC BLOOD PRESSURE: 137 MMHG | WEIGHT: 227 LBS | DIASTOLIC BLOOD PRESSURE: 62 MMHG | RESPIRATION RATE: 18 BRPM | HEART RATE: 98 BPM | HEIGHT: 68 IN | TEMPERATURE: 98 F | OXYGEN SATURATION: 95 %

## 2017-02-23 LAB — POCT GLUCOSE: 74 MG/DL (ref 70–110)

## 2017-02-23 PROCEDURE — 25000003 PHARM REV CODE 250: Performed by: OBSTETRICS & GYNECOLOGY

## 2017-02-23 PROCEDURE — 63600175 PHARM REV CODE 636 W HCPCS: Performed by: OBSTETRICS & GYNECOLOGY

## 2017-02-23 RX ORDER — OXYCODONE AND ACETAMINOPHEN 5; 325 MG/1; MG/1
1 TABLET ORAL EVERY 4 HOURS PRN
Qty: 35 TABLET | Refills: 0 | Status: SHIPPED | OUTPATIENT
Start: 2017-02-23

## 2017-02-23 RX ORDER — DOCUSATE SODIUM 100 MG/1
100 CAPSULE, LIQUID FILLED ORAL 2 TIMES DAILY
Refills: 0 | COMMUNITY
Start: 2017-02-23

## 2017-02-23 RX ORDER — PROMETHAZINE HYDROCHLORIDE 12.5 MG/1
12.5 TABLET ORAL EVERY 6 HOURS PRN
Status: DISCONTINUED | OUTPATIENT
Start: 2017-02-23 | End: 2017-02-23 | Stop reason: HOSPADM

## 2017-02-23 RX ORDER — ONDANSETRON 8 MG/1
8 TABLET, ORALLY DISINTEGRATING ORAL EVERY 6 HOURS PRN
Status: DISCONTINUED | OUTPATIENT
Start: 2017-02-23 | End: 2017-02-23 | Stop reason: HOSPADM

## 2017-02-23 RX ADMIN — HYDROCHLOROTHIAZIDE 12.5 MG: 12.5 TABLET ORAL at 08:02

## 2017-02-23 RX ADMIN — OXYCODONE HYDROCHLORIDE AND ACETAMINOPHEN 1 TABLET: 10; 325 TABLET ORAL at 05:02

## 2017-02-23 RX ADMIN — ENOXAPARIN SODIUM 40 MG: 100 INJECTION SUBCUTANEOUS at 11:02

## 2017-02-23 RX ADMIN — OXYCODONE HYDROCHLORIDE AND ACETAMINOPHEN 1 TABLET: 10; 325 TABLET ORAL at 10:02

## 2017-02-23 RX ADMIN — ONDANSETRON 8 MG: 8 TABLET, ORALLY DISINTEGRATING ORAL at 06:02

## 2017-02-23 RX ADMIN — OLMESARTAN MEDOXOMIL 5 MG: 5 TABLET, FILM COATED ORAL at 11:02

## 2017-02-23 NOTE — PLAN OF CARE
Problem: Patient Care Overview  Goal: Plan of Care Review  Outcome: Ongoing (interventions implemented as appropriate)  POD # 3. Reporting passing flautus. Pt remained free of falls and injury. Bed in low locked position side rails up x 2 with call light and belongings in reach. Non skid socks, teds and scds in place. IS at bedside. Incision intact. Ibuprofen given as scheduled. Patient refusing teds/scds. Educated her of the importance of them. Patient ambulating independently to restroom. PRN pain meds given as ordered. All VSS stable. No acute events thus far. Will continue to monitor.

## 2017-02-23 NOTE — DISCHARGE SUMMARY
[facility]  Discharge Summary  Gynecology      Admit Date: 2/20/2017    Discharge Date and Time:  02/23/2017 8:02 AM    Attending Physician: Rey Hernandez MD     Discharge Provider: Re Penn    Reason for Admission: postoperative care    Procedures Performed: Procedure(s) (LRB):  LAPAROTOMY-EXPLORATORY  Omental biopsy (N/A)    Hospital Course (synopsis of major diagnoses, care, treatment, and services provided during the course of the hospital stay):   61 y.o. AAF with PMHx of DM2 and primary peritoneal cancer presented as scheduled for ExLap/DIANNA/BSO/Debulking; however, intraoperatively disease was found to be too bulky for planned DIANNA/BSO/debulking. ExLap and omental biopsy were performed. Patient tolerated the procedure well, please see Op note for further details. Postoperative course was uncomplicated, and patient made routine advances as anticipated. On POD#3, patient is meeting all postoperative milestones and is ready for discharge. Pain is well-controlled with PO pain medications. Patient is tolerating PO without nausea or vomiting, ambulating and urinating without difficulty, and passing flatus. Patient instructed to continue home medications and lovenox ppx as indicated as well as pain medications as needed, and to follow up with Dr. Hernandez in 4 weeks for postoperative visit.      Consults: none    Significant Diagnostic Studies: Labs:   CBC No results for input(s): WBC, HGB, HCT, PLT in the last 48 hours.    Final Diagnoses:   Principal Problem: Adenocarcinoma carcinomatosis   Secondary Diagnoses:   Active Hospital Problems    Diagnosis  POA    *Adenocarcinoma carcinomatosis [C80.0]  Yes    S/P exploratory laparotomy on 2/20 [Z98.890]  Not Applicable      Resolved Hospital Problems    Diagnosis Date Resolved POA   No resolved problems to display.       Discharged Condition: fair    Disposition: Home or Self Care    Follow Up/Patient Instructions:     Medications:  Reconciled  Home Medications:   Current Discharge Medication List      START taking these medications    Details   docusate sodium (COLACE) 100 MG capsule Take 1 capsule (100 mg total) by mouth 2 (two) times daily.  Refills: 0    Associated Diagnoses: S/P exploratory laparotomy      enoxaparin (LOVENOX) 40 mg/0.4 mL Syrg Inject 0.4 mLs (40 mg total) into the skin every 24 hours as needed.  Qty: 10 mL, Refills: 0    Associated Diagnoses: S/P exploratory laparotomy         CONTINUE these medications which have NOT CHANGED    Details   glutamine 500 mg Cap Take 1,000 mg by mouth 2 (two) times daily.      hydrochlorothiazide (MICROZIDE) 12.5 mg capsule       multivitamin (ONE DAILY MULTIVITAMIN) per tablet Take 1 tablet by mouth once daily.      olmesartan (BENICAR) 5 MG Tab       ondansetron (ZOFRAN-ODT) 8 MG TbDL Take 1 tablet (8 mg total) by mouth every 12 (twelve) hours as needed (nausea and vomiting.).  Qty: 12 tablet, Refills: 1    Associated Diagnoses: Chemotherapy induced nausea and vomiting      diphenhydrAMINE (SOMINEX) 25 mg tablet Take 25 mg by mouth nightly as needed for Insomnia.      metformin (GLUCOPHAGE) 500 MG tablet 2 (two) times daily with meals.       pregabalin (LYRICA) 100 MG capsule Take 1 capsule (100 mg total) by mouth 2 (two) times daily.  Qty: 60 capsule, Refills: 2    Associated Diagnoses: Type 2 diabetes mellitus with diabetic mononeuropathy, without long-term current use of insulin             Discharge Procedure Orders  Diet general     Activity as tolerated     Lifting restrictions   Scheduling Instructions: Lift nothing heavier than ten pounds for 6 weeks     Call MD for:  increased confusion or weakness     Call MD for:  persistent dizziness, light-headedness, or visual disturbances     Call MD for:  worsening rash     Call MD for:  severe persistent headache     Call MD for:  difficulty breathing or increased cough     Call MD for:  redness, tenderness, or signs of infection (pain, swelling,  redness, odor or green/yellow discharge around incision site)     Call MD for:  severe uncontrolled pain     Call MD for:  persistent nausea and vomiting or diarrhea     Call MD for:  temperature >100.4     No dressing needed   Order Comments: Please keep incision clean and dry. Wash daily with soap and water. Allow to air dry completely.       Follow-up Information     Follow up with Rey Hernandez MD. Schedule an appointment as soon as possible for a visit in 4 weeks.    Specialty:  Gynecologic Oncology    Why:  post op check    Contact information:    [address]  [facility] [address] [phone]

## 2017-02-23 NOTE — TELEPHONE ENCOUNTER
Spoke with Ms. Driver, she states pt is considering getting a second opinion with [facility]. Ms. Driver says she has heard a lot about the facility. She was inquiring about getting everything set up. Ms. Driver was advised she can contact pt insurance to check and see if they are in network with the facility or [facility] will check to see if they are in network. She voiced understanding

## 2017-02-23 NOTE — PROGRESS NOTES
Progress Note                    Gynecology Oncology      Admit Date: 02/23/2017  3 Days Post-Op  Hospital Day: 4    SUBJECTIVE:    Beulah Guillaume is a 61 y.o. AAF who is now POD#3 status post ExLap secondary to primary peritoneal cancer. IntraOp disease was too extensive for planned debulking.     Well this AM. Pain controlled. Ambulating. Voiding. Has had flatus x 1 this AM. Ready for DC home.     OBJECTIVE:    Temp:  [98.3 °F (36.8 °C)-98.9 °F (37.2 °C)] 98.4 °F (36.9 °C)  Pulse:  [70-90] 84  Resp:  [18-20] 18  SpO2:  [95 %-98 %] 97 %  BP: (137-189)/(60-94) 156/69      Intake/Output Summary (Last 24 hours) at 02/23/17 0700  Last data filed at 02/23/17 0600   Gross per 24 hour   Intake             1560 ml   Output             1900 ml   Net             -340 ml       Physical Exam:  Gen: A&O, NAD  Pulm: LCTAB  CV: RRR without clicks, rubs or murmurs  Abd: Normoactive BS, ND, soft, mild TTP without rebound or guarding  Incision: Bandage removed, steristrips in place, clean, dry, intact  Extr: PPP, no edema or cyanosis    Laboratory:  Blood Sugars (AccuCheck):    Recent Labs      02/20/17   2248  02/21/17   0554  02/21/17   1738  02/21/17   2048  02/22/17   0815  02/22/17   1120  02/22/17   1829  02/22/17   2035   POCTGLUCOSE  221*  125*  137*  130*  126*  86  113*  123*       Recent Labs  Lab 02/21/17  0515   WBC 7.70   HGB 9.6*   HCT 28.9*   MCV 82           ASSESSMENT/PLAN:    Active Hospital Problems    Diagnosis  POA    *Adenocarcinoma carcinomatosis [C80.0]  Yes    S/P exploratory laparotomy on 2/20 [Z98.890]  Not Applicable      Resolved Hospital Problems    Diagnosis Date Resolved POA   No resolved problems to display.       61 y.o. AAF with past medical history significant for DM2 now POD#3 status post ExLap secondary to primary peritoneal cancer. Disease too bulky for planned DIANNA/BSO/debulking.     1. Post-op. VSS. PE unremarkable.  - Continue routine advances  - Encourage ambulation   -  Contine PO pain meds   - Regular diet  - Continue IS  - H/H 9.3/29.5 >9.6/28.9  - Colace, simethicone prn    2. DM2  - Check blood glucose ACHS    - SSI  - Home metformin held     3. Neuropathy  - Home gabapentin held, resume on dischage    4. DVT PPx  - Lovnenox 40/daily     Dispo: discharge today.    Re Anna MD PGY-3   Ob-Gyn Resident

## 2017-02-23 NOTE — TELEPHONE ENCOUNTER
----- Message from Christine Meadows sent at 2/23/2017  9:56 AM CST -----  RE: Beulah Guillaume        Clinic# [id number]     Ms Driver/pt's daughter/# [phone]/needs to speak with medical assistant/regarding Ms Guillaume going to [facility]?

## 2017-02-24 LAB
BLD PROD TYP BPU: NORMAL
BLD PROD TYP BPU: NORMAL
BLOOD UNIT EXPIRATION DATE: NORMAL
BLOOD UNIT EXPIRATION DATE: NORMAL
BLOOD UNIT TYPE CODE: 7300
BLOOD UNIT TYPE CODE: 7300
BLOOD UNIT TYPE: NORMAL
BLOOD UNIT TYPE: NORMAL
CODING SYSTEM: NORMAL
CODING SYSTEM: NORMAL
DISPENSE STATUS: NORMAL
DISPENSE STATUS: NORMAL
TRANS ERYTHROCYTES VOL PATIENT: NORMAL ML
TRANS ERYTHROCYTES VOL PATIENT: NORMAL ML

## 2017-03-01 ENCOUNTER — TELEPHONE (OUTPATIENT)
Dept: GYNECOLOGIC ONCOLOGY | Facility: CLINIC | Age: 62
End: 2017-03-01

## 2017-03-01 NOTE — TELEPHONE ENCOUNTER
----- Message from Rey Hernandez MD sent at 3/1/2017  1:24 PM CST -----  Patient informed of pathology report. I told her that I have requested additional stains. She informed me that she has a 2nd opinion at [facility].   I told her we would forward needed information to them.

## 2017-03-03 ENCOUNTER — TELEPHONE (OUTPATIENT)
Dept: GYNECOLOGIC ONCOLOGY | Facility: CLINIC | Age: 62
End: 2017-03-03

## 2017-03-04 NOTE — TELEPHONE ENCOUNTER
Patient called stating having some GERD and nausea today. She had a normal bowel movement this morning (large, soft) and has been passing flatus all day. She is able to keep down fluids. She has no fever or chills. She has not attempted solids. Informed patient okay to take Prilosec and Zofran tonight. If has persistent nausea and vomiting, to report to ED. Understands.

## 2017-03-07 LAB — FUNGUS SPEC CULT: NORMAL

## 2017-03-09 ENCOUNTER — TELEPHONE (OUTPATIENT)
Dept: GYNECOLOGIC ONCOLOGY | Facility: CLINIC | Age: 62
End: 2017-03-09

## 2017-03-09 DIAGNOSIS — R18.0 MALIGNANT ASCITES: ICD-10-CM

## 2017-03-09 NOTE — TELEPHONE ENCOUNTER
Spoke with pt. Per dr. Hernandez, pt is aware she needs a paracentesis. Pt advised IR is closed, a message was left for them to contact my ext, if not I will contact them first thing in the morning to get paracentesis set up. She voiced understanding

## 2017-03-09 NOTE — TELEPHONE ENCOUNTER
Pt c/o fluid building up in abdomen, had time walking, breathing heavy, can't eat or drink much. Pt is drinking glucerna and has stopped taking her metformin. Pt states she has an appt with the Cancer center on 3/20/17 for a second opinion and post op with Dr. Hernandez 3/17/17. Pt don't think she can make it until next week. She advised Dr. Hernandez is in clinic. She voiced understanding. Pt will like speak directly to physician.

## 2017-03-09 NOTE — TELEPHONE ENCOUNTER
----- Message from Christine Meadows sent at 3/9/2017 11:05 AM CST -----  Beulah Guillaume would like to speak with medical assistant/pt can be reached at [phone]       [facility]# [id number]

## 2017-03-10 ENCOUNTER — HOSPITAL ENCOUNTER (OUTPATIENT)
Dept: INTERVENTIONAL RADIOLOGY/VASCULAR | Facility: HOSPITAL | Age: 62
Discharge: HOME | End: 2017-03-10
Attending: OBSTETRICS & GYNECOLOGY
Payer: COMMERCIAL

## 2017-03-10 ENCOUNTER — TELEPHONE (OUTPATIENT)
Dept: GYNECOLOGIC ONCOLOGY | Facility: CLINIC | Age: 62
End: 2017-03-10

## 2017-03-10 VITALS
DIASTOLIC BLOOD PRESSURE: 57 MMHG | RESPIRATION RATE: 18 BRPM | HEART RATE: 77 BPM | SYSTOLIC BLOOD PRESSURE: 124 MMHG | OXYGEN SATURATION: 100 %

## 2017-03-10 DIAGNOSIS — R18.0 MALIGNANT ASCITES: ICD-10-CM

## 2017-03-10 PROCEDURE — 49083 ABD PARACENTESIS W/IMAGING: CPT | Mod: ,,, | Performed by: NURSE PRACTITIONER

## 2017-03-10 PROCEDURE — C1729 CATH, DRAINAGE: HCPCS

## 2017-03-10 PROCEDURE — P9047 ALBUMIN (HUMAN), 25%, 50ML: HCPCS | Performed by: OBSTETRICS & GYNECOLOGY

## 2017-03-10 PROCEDURE — 63600175 PHARM REV CODE 636 W HCPCS: Performed by: OBSTETRICS & GYNECOLOGY

## 2017-03-10 RX ORDER — ALBUMIN HUMAN 250 G/1000ML
37.5 SOLUTION INTRAVENOUS ONCE
Status: COMPLETED | OUTPATIENT
Start: 2017-03-10 | End: 2017-03-10

## 2017-03-10 RX ADMIN — ALBUMIN (HUMAN) 37.5 G: 25 SOLUTION INTRAVENOUS at 02:03

## 2017-03-10 NOTE — PROGRESS NOTES
Paracentesis complete. 5900 mLs peritoneal fluid drained. Pt tolerated well. Dressing to left abd clean, dry, and intact. Albumin 25% given 150mLs. Discharge plans reviewed. Pt discharged

## 2017-03-10 NOTE — H&P
Radiology History & Physical      SUBJECTIVE:     Chief Complaint: Ascites    History of Present Illness:  Beulah Guillaume is a 61 y.o. female who presents for ultrasound guided paracentesis  Past Medical History:   Diagnosis Date    Abnormal liver CT     Abnormal Pap smear     1985    Arthritis 2016    Chemotherapy induced nausea and vomiting 2016    Diabetes mellitus     Hypertension     Malignant ascites 3/9/2017    Mass of middle lobe of right lung 2017    Post-menopausal bleeding 2016    Type 2 diabetes mellitus with diabetic mononeuropathy, without long-term current use of insulin 2017    Type 2 diabetes mellitus without complication 2016     Past Surgical History:   Procedure Laterality Date     SECTION      x 2    COLPOSCOPY      DILATION AND CURETTAGE OF UTERUS      for Polyps       Home Meds:   Prior to Admission medications    Medication Sig Start Date End Date Taking? Authorizing Provider   diphenhydrAMINE (SOMINEX) 25 mg tablet Take 25 mg by mouth nightly as needed for Insomnia.    Historical Provider, MD   docusate sodium (COLACE) 100 MG capsule Take 1 capsule (100 mg total) by mouth 2 (two) times daily. 17   Re Penn MD   enoxaparin (LOVENOX) 40 mg/0.4 mL Syrg Inject 0.4 mLs (40 mg total) into the skin every 24 hours as needed. 2/21/17 3/14/17  Re Penn MD   glutamine 500 mg Cap Take 1,000 mg by mouth 2 (two) times daily.    Historical Provider, MD   hydrochlorothiazide (MICROZIDE) 12.5 mg capsule  13   Historical Provider, MD   metformin (GLUCOPHAGE) 500 MG tablet 2 (two) times daily with meals.  11   Historical Provider, MD   multivitamin (ONE DAILY MULTIVITAMIN) per tablet Take 1 tablet by mouth once daily.    Historical Provider, MD   olmesartan (BENICAR) 5 MG Tab  11   Historical Provider, MD   ondansetron (ZOFRAN-ODT) 8 MG TbDL Take 1 tablet (8 mg total) by mouth every 12 (twelve) hours as needed (nausea and  vomiting.). 11/29/16   Rey Hernandez MD   oxycodone-acetaminophen (PERCOCET) 5-325 mg per tablet Take 1 tablet by mouth every 4 (four) hours as needed. 2/23/17   Re Penn MD   pregabalin (LYRICA) 100 MG capsule Take 1 capsule (100 mg total) by mouth 2 (two) times daily. 1/27/17 1/27/18  Rey Hernandez MD     Anticoagulants/Antiplatelets: Lovenox    Allergies:   Review of patient's allergies indicates:   Allergen Reactions    No known allergies      Sedation History:  no adverse reactions    Review of Systems:   Hematological: no known coagulopathies  Respiratory: no shortness of breath  Cardiovascular: no chest pain  Gastrointestinal: no abdominal pain  Genito-Urinary: no dysuria  Musculoskeletal: negative  Neurological: no TIA or stroke symptoms         OBJECTIVE:     Vital Signs (Most Recent)       Physical Exam:  ASA: 3  Mallampati: na    General: no acute distress  Mental Status: alert and oriented to person, place and time  HEENT: normocephalic, atraumatic  Chest: unlabored breathing  Heart: regular heart rate  Abdomen: distended  Extremity: moves all extremities    Laboratory  Lab Results   Component Value Date    INR 1.0 03/10/2017       Lab Results   Component Value Date    WBC 7.70 02/21/2017    HGB 9.6 (L) 02/21/2017    HCT 28.9 (L) 02/21/2017    MCV 82 02/21/2017     02/21/2017      Lab Results   Component Value Date    GLU 79 02/13/2017     02/13/2017    K 4.3 02/13/2017     02/13/2017    CO2 27 02/13/2017    BUN 35 (H) 02/13/2017    CREATININE 1.0 02/13/2017    CALCIUM 9.6 02/13/2017    ALT 28 11/18/2016    AST 19 11/18/2016    ALBUMIN 3.0 (L) 11/18/2016    BILITOT 0.2 11/18/2016       ASSESSMENT/PLAN:     Sedation Plan: local  Patient will undergo ultrasound guided paracentesis.    SARAH Cardona, FNP  Interventional Radiology  [phone] spectralink

## 2017-03-10 NOTE — TELEPHONE ENCOUNTER
Spoke with pt. She is aware she is scheduled for pt-inr labs for 12 noon and paracentesis scheduled for 1:00. she says thank you

## 2017-03-10 NOTE — IP AVS SNAPSHOT
[facility]  [address]  [facility] [address]  Phone: [phone]           Patient Discharge Instructions     Our goal is to set you up for success. This packet includes information on your condition, medications, and your home care. It will help you to care for yourself so you don't get sicker and need to go back to the hospital.     Please ask your nurse if you have any questions.        There are many details to remember when preparing to leave the hospital. Here is what you will need to do:    1. Take your medicine. If you are prescribed medications, review your Medication List in the following pages. You may have new medications to  at the pharmacy and others that you'll need to stop taking. Review the instructions for how and when to take your medications. Talk with your doctor or nurses if you are unsure of what to do.     2. Go to your follow-up appointments. Specific follow-up information is listed in the following pages. Your may be contacted by a transition nurse or clinical provider about future appointments. Be sure we have all of the phone numbers to reach you, if needed. Please contact your provider's office if you are unable to make an appointment.     3. Watch for warning signs. Your doctor or nurse will give you detailed warning signs to watch for and when to call for assistance. These instructions may also include educational information about your condition. If you experience any of warning signs to your health, call your doctor.               [facility]  Unless otherwise directed by your provider, please contact [facility], our nurse care line that is available for 24/7 assistance.     [phone] (toll-free)    Registered nurses in the [facility] provide clinical advisement, health education, appointment booking, and other advisory services.                    ** Verify the list of medication(s) below is accurate and up  to date. Carry this with you in case of emergency. If your medications have changed, please notify your healthcare provider.             Medication List      TAKE these medications        Additional Info                      BENICAR 5 MG Tab   Refills:  0   Generic drug:  olmesartan      Begin Date    AM    Noon    PM    Bedtime       diphenhydrAMINE 25 mg tablet   Commonly known as:  SOMINEX   Refills:  0   Dose:  25 mg    Instructions:  Take 25 mg by mouth nightly as needed for Insomnia.     Begin Date    AM    Noon    PM    Bedtime       docusate sodium 100 MG capsule   Commonly known as:  COLACE   Refills:  0   Dose:  100 mg    Instructions:  Take 1 capsule (100 mg total) by mouth 2 (two) times daily.     Begin Date    AM    Noon    PM    Bedtime       enoxaparin 40 mg/0.4 mL Syrg   Commonly known as:  LOVENOX   Quantity:  10 mL   Refills:  0   Dose:  40 mg    Instructions:  Inject 0.4 mLs (40 mg total) into the skin every 24 hours as needed.     Begin Date    AM    Noon    PM    Bedtime       GLUCOPHAGE 500 MG tablet   Refills:  0   Generic drug:  metformin    Instructions:  2 (two) times daily with meals.     Begin Date    AM    Noon    PM    Bedtime       glutamine 500 mg Cap   Refills:  0   Dose:  1000 mg    Instructions:  Take 1,000 mg by mouth 2 (two) times daily.     Begin Date    AM    Noon    PM    Bedtime       hydrochlorothiazide 12.5 mg capsule   Commonly known as:  MICROZIDE   Refills:  0      Begin Date    AM    Noon    PM    Bedtime       ondansetron 8 MG Tbdl   Commonly known as:  ZOFRAN-ODT   Quantity:  12 tablet   Refills:  1   Dose:  8 mg    Instructions:  Take 1 tablet (8 mg total) by mouth every 12 (twelve) hours as needed (nausea and vomiting.).     Begin Date    AM    Noon    PM    Bedtime       ONE DAILY MULTIVITAMIN per tablet   Refills:  0   Dose:  1 tablet   Generic drug:  multivitamin    Instructions:  Take 1 tablet by mouth once daily.     Begin Date    AM    Noon    PM    Bedtime        oxycodone-acetaminophen 5-325 mg per tablet   Commonly known as:  PERCOCET   Quantity:  35 tablet   Refills:  0   Dose:  1 tablet    Instructions:  Take 1 tablet by mouth every 4 (four) hours as needed.     Begin Date    AM    Noon    PM    Bedtime       pregabalin 100 MG capsule   Commonly known as:  LYRICA   Quantity:  60 capsule   Refills:  2   Dose:  100 mg    Instructions:  Take 1 capsule (100 mg total) by mouth 2 (two) times daily.     Begin Date    AM    Noon    PM    Bedtime                  Please bring to all follow up appointments:    1. A copy of your discharge instructions.  2. All medicines you are currently taking in their original bottles.  3. Identification and insurance card.    Please arrive 15 minutes ahead of scheduled appointment time.    Please call 24 hours in advance if you must reschedule your appointment and/or time.        Your Scheduled Appointments     Mar 17, 2017  2:30 PM CDT   Post OP with Rey Hernandez MD   [facility] - GYN Oncology ([facility] )    [address]  [place] [address] [phone]                  Discharge Instructions       ROCU MON-FRI 8 AM- 5PM [phone]. Radiology resident on call [phone].    Discharge References/Attachments     PARACENTESIS, DISCHARGE INSTRUCTIONS FOR (ENGLISH)        Admission Information     Date & Time Provider Department CSN    3/10/2017  1:00 PM Rey Hernandez MD [facility] [id number]      Care Providers     Provider Role Specialty Primary office phone    Rey Hernandez MD Attending Provider Gynecologic Oncology [phone]      Your Vitals Were     BP Pulse Resp Last Period SpO2       124/57 (BP Location: Right arm, Patient Position: Lying) 77 18 06/20/2014 100%       Recent Lab Values     No lab values to display.      Allergies as of 3/10/2017        Reactions    No Known Allergies       Advance Directives     An advance directive is a document which, in the event you are no longer able  to make decisions for yourself, tells your healthcare team what kind of treatment you do or do not want to receive, or who you would like to make those decisions for you.  If you do not currently have an advance directive, [facility] encourages you to create one.  For more information call:  [phone]), [phone] ([phone]),  or log on to [email or web address].        Language Assistance Services     ATTENTION: Language assistance services are available, free of charge. Please call [phone].      ATENCIÓN: Si habla español, tiene a michelle disposición servicios gratuitos de asistencia lingüística. Llame al [phone].     CHÚ Ý: N?u b?n nói Ti?ng Vi?t, có các d?ch v? h? tr? ngôn ng? mi?n phí dành cho b?n. G?i s? [phone].        Diabetes Discharge Instructions                                   [facility] Sign-Up     Activating your [facility] account is as easy as 1-2-3!     1) Visit [email or web address], select Sign Up Now, enter this activation code and your date of birth, then select Next.  [email or web address]  Expires: 4/24/2017  2:46 PM      2) Create a username and password to use when you visit [facility] in the future and select a security question in case you lose your password and select Next.    3) Enter your e-mail address and click Sign Up!    Additional Information  If you have questions, please e-mail [email or web address] or call [phone] to talk to our [facility] staff. Remember, [facility] is NOT to be used for urgent needs. For medical emergencies, dial 911.          [facility] complies with applicable Federal civil rights laws and does not discriminate on the basis of race, color, national origin, age, disability, or sex.

## 2017-03-10 NOTE — PROCEDURES
Radiology Post-Procedure Note    Pre Op Diagnosis: Ascites  Post Op Diagnosis: Same    Procedure: Ultrasound Guided Paracentesis    Procedure performed by: Akil Ramires NP/ Cj Rae MD    Written Informed Consent Obtained: Yes  Specimen Removed: YES dark red (bloody fluid)  Estimated Blood Loss: Minimal    Findings:   Successful paracentesis.  Albumin administered PRN per protocol.    Patient tolerated procedure well.    SARAH Cardona, FNP  Interventional Radiology  [phone] spectralink

## 2017-03-14 DIAGNOSIS — R11.2 CHEMOTHERAPY INDUCED NAUSEA AND VOMITING: ICD-10-CM

## 2017-03-14 DIAGNOSIS — T45.1X5A CHEMOTHERAPY INDUCED NAUSEA AND VOMITING: ICD-10-CM

## 2017-03-14 RX ORDER — ONDANSETRON 8 MG/1
8 TABLET, ORALLY DISINTEGRATING ORAL EVERY 12 HOURS PRN
Qty: 24 TABLET | Refills: 1 | Status: SHIPPED | OUTPATIENT
Start: 2017-03-14 | End: 2017-03-17 | Stop reason: SDUPTHER

## 2017-03-16 ENCOUNTER — TELEPHONE (OUTPATIENT)
Dept: GYNECOLOGIC ONCOLOGY | Facility: CLINIC | Age: 62
End: 2017-03-16

## 2017-03-16 DIAGNOSIS — R42 VERTIGO: ICD-10-CM

## 2017-03-16 RX ORDER — MECLIZINE HCL 12.5 MG 12.5 MG/1
12.5 TABLET ORAL 3 TIMES DAILY PRN
Qty: 30 TABLET | Refills: 1 | Status: SHIPPED | OUTPATIENT
Start: 2017-03-16

## 2017-03-16 NOTE — TELEPHONE ENCOUNTER
Spoke with pt. Per Dr. Hernandez, pt is aware physician can not prescribe anything for dizziness, pt states she does not need to be tap she just had one done Friday and she is going to [facility]17. Pt informed meclizine 12.5 mg has been sent to her pharmacy. She says thank you

## 2017-03-17 ENCOUNTER — OFFICE VISIT (OUTPATIENT)
Dept: GYNECOLOGIC ONCOLOGY | Facility: CLINIC | Age: 62
End: 2017-03-17
Payer: COMMERCIAL

## 2017-03-17 VITALS
HEART RATE: 92 BPM | BODY MASS INDEX: 29.51 KG/M2 | HEIGHT: 68 IN | SYSTOLIC BLOOD PRESSURE: 108 MMHG | DIASTOLIC BLOOD PRESSURE: 54 MMHG | WEIGHT: 194.69 LBS

## 2017-03-17 DIAGNOSIS — C80.0 ADENOCARCINOMA CARCINOMATOSIS: Primary | ICD-10-CM

## 2017-03-17 DIAGNOSIS — Z98.890 S/P EXPLORATORY LAPAROTOMY: ICD-10-CM

## 2017-03-17 DIAGNOSIS — T45.1X5A CHEMOTHERAPY INDUCED NAUSEA AND VOMITING: ICD-10-CM

## 2017-03-17 DIAGNOSIS — R11.2 CHEMOTHERAPY INDUCED NAUSEA AND VOMITING: ICD-10-CM

## 2017-03-17 PROCEDURE — 99999 PR PBB SHADOW E&M-EST. PATIENT-LVL III: CPT | Mod: PBBFAC,,, | Performed by: OBSTETRICS & GYNECOLOGY

## 2017-03-17 PROCEDURE — 99024 POSTOP FOLLOW-UP VISIT: CPT | Mod: S$GLB,,, | Performed by: OBSTETRICS & GYNECOLOGY

## 2017-03-17 RX ORDER — ONDANSETRON 8 MG/1
8 TABLET, ORALLY DISINTEGRATING ORAL EVERY 12 HOURS PRN
Qty: 24 TABLET | Refills: 1 | Status: SHIPPED | OUTPATIENT
Start: 2017-03-17

## 2017-03-17 RX ORDER — METFORMIN HYDROCHLORIDE 500 MG/1
500 TABLET, EXTENDED RELEASE ORAL 2 TIMES DAILY
Refills: 2 | COMMUNITY
Start: 2017-01-20

## 2017-03-17 NOTE — MR AVS SNAPSHOT
[facility]  [address] [place] [address]  Phone: [phone]                  Beulah Guillaume   3/17/2017 2:30 PM   Office Visit    Description:  Female : 1955   Provider:  Rey Hernandez MD   Department:  [facility]           Reason for Visit     Post-op Evaluation           Diagnoses this Visit        Comments    Adenocarcinoma carcinomatosis    -  Primary     Chemotherapy induced nausea and vomiting         S/P exploratory laparotomy                To Do List           Goals (5 Years of Data)     None       These Medications        Disp Refills Start End    ondansetron (ZOFRAN-ODT) 8 MG TbDL 24 tablet 1 3/17/2017     Take 1 tablet (8 mg total) by mouth every 12 (twelve) hours as needed (nausea and vomiting.). - Oral    [id number] [address] #: [phone]         [facility]     [facility] On Call Nurse Care Line -  Assistance  Registered nurses in the [facility] On Call Center provide clinical advisement, health education, appointment booking, and other advisory services.  Call for this free service at [phone].             Medications           Message regarding Medications     Verify the changes and/or additions to your medication regime listed below are the same as discussed with your clinician today.  If any of these changes or additions are incorrect, please notify your healthcare provider.             Verify that the below list of medications is an accurate representation of the medications you are currently taking.  If none reported, the list may be blank. If incorrect, please contact your healthcare provider. Carry this list with you in case of emergency.           Current Medications     docusate sodium (COLACE) 100 MG capsule Take 1 capsule (100 mg total) by mouth 2 (two) times daily.    ondansetron (ZOFRAN-ODT) 8 MG TbDL Take 1 tablet (8 mg total) by mouth every 12 (twelve) hours as  "needed (nausea and vomiting.).    diphenhydrAMINE (SOMINEX) 25 mg tablet Take 25 mg by mouth nightly as needed for Insomnia.    glutamine 500 mg Cap Take 1,000 mg by mouth 2 (two) times daily.    hydrochlorothiazide (MICROZIDE) 12.5 mg capsule     meclizine (ANTIVERT) 12.5 mg tablet Take 1 tablet (12.5 mg total) by mouth 3 (three) times daily as needed.    metformin (GLUCOPHAGE) 500 MG tablet 2 (two) times daily with meals.     metformin (GLUCOPHAGE-XR) 500 MG 24 hr tablet Take 500 mg by mouth 2 (two) times daily.    multivitamin (ONE DAILY MULTIVITAMIN) per tablet Take 1 tablet by mouth once daily.    olmesartan (BENICAR) 5 MG Tab     oxycodone-acetaminophen (PERCOCET) 5-325 mg per tablet Take 1 tablet by mouth every 4 (four) hours as needed.    pregabalin (LYRICA) 100 MG capsule Take 1 capsule (100 mg total) by mouth 2 (two) times daily.           Clinical Reference Information           Your Vitals Were     BP Pulse Height Weight Last Period BMI    108/54 92 5' 8" (1.727 m) 88.3 kg (194 lb 10.7 oz) 06/20/2014 29.6 kg/m2      Blood Pressure          Most Recent Value    BP  (!)  108/54      Allergies as of 3/17/2017     No Known Allergies      Immunizations Administered on Date of Encounter - 3/17/2017     None      [facility] Sign-Up     Activating your [facility] account is as easy as 1-2-3!     1) Visit [email or web address], select Sign Up Now, enter this activation code and your date of birth, then select Next.  [email or web address]  Expires: 4/24/2017  3:46 PM      2) Create a username and password to use when you visit [facility] in the future and select a security question in case you lose your password and select Next.    3) Enter your e-mail address and click Sign Up!    Additional Information  If you have questions, please e-mail [email or web address] or call [phone] to talk to our [facility] staff. Remember, [facility] is NOT to be used for urgent needs. For medical emergencies, dial 911.         Language " Assistance Services     ATTENTION: Language assistance services are available, free of charge. Please call [phone].      ATENCIÓN: Si habla sanazañol, tiene a michelle disposición servicios gratuitos de asistencia lingüística. Llame al [phone].     CHÚ Ý: N?u b?n nói Ti?ng Vi?t, có các d?ch v? h? tr? ngôn ng? mi?n phí dành cho b?n. G?i s? [phone].         [facility] complies with applicable Federal civil rights laws and does not discriminate on the basis of race, color, national origin, age, disability, or sex.

## 2017-03-17 NOTE — PROGRESS NOTES
"Subjective:       Patient ID: Beulah Guillaume is a 61 y.o. female.    Chief Complaint: Post-op Evaluation (Interval debulking on 2/20/17)    HPI     Patient comes in today for post op visit after exp lap on 2/20/2017 for interval debulking in which patient was found to have extensive carcinomatosis that was unresectable.     Taking zofran on a daily basis. Taking in glucerna and smoothies.  Daily bowel movement and passing flatus.     Had paracentesis on 3/10/2017 with 5.9 L of fluid removed.     Vaginal spotting.     Has an appt with [facility] in [place] on 3/21/2017. She was referred there from [facility] when she was originally to go for a 2nd opinion. She was told that [facility] was more familiar with [facility].       Review of Systems   Constitutional: Positive for appetite change (decreased ). Negative for chills, fatigue and fever.   Respiratory: Positive for shortness of breath. Negative for cough and wheezing.    Cardiovascular: Negative for chest pain, palpitations and leg swelling.   Gastrointestinal: Positive for abdominal distention and nausea. Negative for abdominal pain, constipation, diarrhea and vomiting.   Genitourinary: Negative for difficulty urinating, dysuria, frequency, genital sores, hematuria, urgency, vaginal bleeding, vaginal discharge and vaginal pain.   Neurological: Positive for weakness.   Hematological: Negative for adenopathy. Does not bruise/bleed easily.   Psychiatric/Behavioral: The patient is not nervous/anxious.        Objective:     BP (!) 108/54  Pulse 92  Ht 5' 8" (1.727 m)  Wt 88.3 kg (194 lb 10.7 oz)  LMP 06/20/2014  BMI 29.6 kg/m2    Physical Exam   Constitutional: She is oriented to person, place, and time.   Thin appearing.    HENT:   Head: Normocephalic and atraumatic.   Eyes: No scleral icterus.   Abdominal: Soft. She exhibits distension (mild distension. soft. ). She exhibits no mass. There is no tenderness. There is no rebound and no guarding. No " hernia.   Healed incision.    Neurological: She is alert and oriented to person, place, and time.   Skin: Skin is warm and dry.   Psychiatric: She has a normal mood and affect. Her behavior is normal. Judgment and thought content normal.       Assessment:       1. Adenocarcinoma carcinomatosis    2. Chemotherapy induced nausea and vomiting    3. S/P exploratory laparotomy on 2/20        Plan:   Adenocarcinoma carcinomatosis  I mentioned that I did not think there would be a role for [facility]. Patient and daughter became upset when I told them this.  I offered to plsy any role in her care should the [facility] decide upon standard chemotherapy.   If she is eligible for a clinical trial, then I offered to fax any labs that she might need.     I gave them cautious optimism.       Chemotherapy induced nausea and vomiting  -     ondansetron (ZOFRAN-ODT) 8 MG TbDL; Take 1 tablet (8 mg total) by mouth every 12 (twelve) hours as needed (nausea and vomiting.).  Dispense: 24 tablet; Refill: 1    S/P exploratory laparotomy on 2/20

## 2017-04-07 LAB
ACID FAST MOD KINY STN SPEC: NORMAL
MYCOBACTERIUM SPEC QL CULT: NORMAL

## 2018-03-26 NOTE — TELEPHONE ENCOUNTER
----- Message from Christine Meadows sent at 3/16/2017  3:52 PM CDT -----  Beulah Guillaume said she is dizzy/wants medication called in to her pharmacy/pt can be reached at [phone]       [facility]# [id number]  
Spoke with pt. She states she fall this past weekend, vomit all day yesterday, pt not eating on liquids, pt got up today and felt dizzy. Pt has appt tomorrow with physician, wants to know if something can be called into her pharmacy. Pt advised I will speak with Dr. Hernandez, she voiced understanding  
Concussion    Seizures
